# Patient Record
Sex: FEMALE | Race: WHITE | NOT HISPANIC OR LATINO | Employment: FULL TIME | URBAN - METROPOLITAN AREA
[De-identification: names, ages, dates, MRNs, and addresses within clinical notes are randomized per-mention and may not be internally consistent; named-entity substitution may affect disease eponyms.]

---

## 2017-02-02 ENCOUNTER — TELEPHONE (OUTPATIENT)
Dept: OBSTETRICS AND GYNECOLOGY | Age: 37
End: 2017-02-02

## 2017-02-02 ENCOUNTER — OFFICE VISIT (OUTPATIENT)
Dept: OBSTETRICS AND GYNECOLOGY | Age: 37
End: 2017-02-02

## 2017-02-02 ENCOUNTER — PROCEDURE VISIT (OUTPATIENT)
Dept: OBSTETRICS AND GYNECOLOGY | Age: 37
End: 2017-02-02

## 2017-02-02 VITALS
BODY MASS INDEX: 22.99 KG/M2 | HEIGHT: 65 IN | DIASTOLIC BLOOD PRESSURE: 82 MMHG | WEIGHT: 138 LBS | SYSTOLIC BLOOD PRESSURE: 118 MMHG

## 2017-02-02 DIAGNOSIS — O09.521 AMA (ADVANCED MATERNAL AGE) MULTIGRAVIDA 35+, FIRST TRIMESTER: Primary | ICD-10-CM

## 2017-02-02 DIAGNOSIS — O20.9 VAGINAL BLEEDING IN PREGNANCY, FIRST TRIMESTER: ICD-10-CM

## 2017-02-02 DIAGNOSIS — O20.0 THREATENED ABORTION: Primary | ICD-10-CM

## 2017-02-02 PROCEDURE — 99213 OFFICE O/P EST LOW 20 MIN: CPT | Performed by: OBSTETRICS & GYNECOLOGY

## 2017-02-02 PROCEDURE — 96372 THER/PROPH/DIAG INJ SC/IM: CPT | Performed by: OBSTETRICS & GYNECOLOGY

## 2017-02-02 PROCEDURE — 76817 TRANSVAGINAL US OBSTETRIC: CPT | Performed by: OBSTETRICS & GYNECOLOGY

## 2017-02-02 NOTE — PROGRESS NOTES
"Subjective      Jimena Ferguson is a 36 y.o. female.  Jimena reports bleeding her bleeding is bright red and heavy like a period.  She would be about 6 weeks by last menstrual period.  Blood type is negative AB- . She is not in acute distress. Ectopic risks: none.    Cycle length: regular.  Pregnancy testing: at home.  Pregnancy imaging: not done.  Blood type: AB negative.  Other lab results: none.    The following portions of the patient's history were reviewed and updated as appropriate: allergies, current medications, past family history, past medical history, past social history, past surgical history and problem list.    Review of Systems  no abdominal pain, or dizziness     Objective        Visit Vitals   • /82   • Ht 65\" (165.1 cm)   • Wt 138 lb (62.6 kg)   • LMP 2016   • BMI 22.96 kg/m2       General: alert, appears stated age and cooperative   Heart:    Lungs:    Abdomen: soft, non-tender, without masses or organomegaly   Vulva: normal   Vagina: normal mucosa   Cervix: no cervical motion tenderness   Uterus: bulky, non-tender, retroverted   Adnexa: no mass, fullness, tenderness     Imaging  Limited office ultrasound: small gest sac with no yolk sac or fetal pole, no FF ? Mass near right adnexa      Assessment/Plan     Jimena was seen today for follow-up.    Diagnoses and all orders for this visit:    Threatened   -     CBC & Differential  -     HCG, B-subunit, Quantitative  -     Type & Screen  -     Rubella Antibody, IgG  -     Progesterone      Ectopic warnings given - check labs, stop work and check BHCG in 48 hours  Rhogam today  "

## 2017-02-03 LAB
BASOPHILS # BLD AUTO: 0 X10E3/UL (ref 0–0.2)
BASOPHILS NFR BLD AUTO: 0 %
EOSINOPHIL # BLD AUTO: 0.2 X10E3/UL (ref 0–0.4)
EOSINOPHIL NFR BLD AUTO: 2 %
ERYTHROCYTE [DISTWIDTH] IN BLOOD BY AUTOMATED COUNT: 12.3 % (ref 12.3–15.4)
HCG INTACT+B SERPL-ACNC: 4954 MIU/ML
HCT VFR BLD AUTO: 33.5 % (ref 34–46.6)
HGB BLD-MCNC: 11.6 G/DL (ref 11.1–15.9)
IMM GRANULOCYTES # BLD: 0 X10E3/UL (ref 0–0.1)
IMM GRANULOCYTES NFR BLD: 0 %
LYMPHOCYTES # BLD AUTO: 2.1 X10E3/UL (ref 0.7–3.1)
LYMPHOCYTES NFR BLD AUTO: 24 %
MCH RBC QN AUTO: 32.4 PG (ref 26.6–33)
MCHC RBC AUTO-ENTMCNC: 34.6 G/DL (ref 31.5–35.7)
MCV RBC AUTO: 94 FL (ref 79–97)
MONOCYTES # BLD AUTO: 0.7 X10E3/UL (ref 0.1–0.9)
MONOCYTES NFR BLD AUTO: 8 %
NEUTROPHILS # BLD AUTO: 5.9 X10E3/UL (ref 1.4–7)
NEUTROPHILS NFR BLD AUTO: 66 %
PLATELET # BLD AUTO: 236 X10E3/UL (ref 150–379)
PROGEST SERPL-MCNC: 7.1 NG/ML
RBC # BLD AUTO: 3.58 X10E6/UL (ref 3.77–5.28)
RUBV IGG SERPL IA-ACNC: 3.37 INDEX
WBC # BLD AUTO: 9 X10E3/UL (ref 3.4–10.8)

## 2017-02-04 ENCOUNTER — APPOINTMENT (OUTPATIENT)
Dept: LAB | Facility: HOSPITAL | Age: 37
End: 2017-02-04

## 2017-02-04 ENCOUNTER — RESULTS ENCOUNTER (OUTPATIENT)
Dept: OBSTETRICS AND GYNECOLOGY | Age: 37
End: 2017-02-04

## 2017-02-04 DIAGNOSIS — O20.0 THREATENED ABORTION: ICD-10-CM

## 2017-02-04 DIAGNOSIS — O20.0 THREATENED ABORTION: Primary | ICD-10-CM

## 2017-02-04 LAB — HCG INTACT+B SERPL-ACNC: 1303 MIU/ML

## 2017-02-04 PROCEDURE — 36415 COLL VENOUS BLD VENIPUNCTURE: CPT | Performed by: OBSTETRICS & GYNECOLOGY

## 2017-02-04 PROCEDURE — 84702 CHORIONIC GONADOTROPIN TEST: CPT | Performed by: OBSTETRICS & GYNECOLOGY

## 2017-02-04 NOTE — PROGRESS NOTES
Lab called and needed order placed for HCG, they have an order but it is not signed by MD. Reviewed chart and confirmed plan for hcg today and notified primary OB of pt's lab draw today.

## 2017-02-07 ENCOUNTER — TELEPHONE (OUTPATIENT)
Dept: OBSTETRICS AND GYNECOLOGY | Age: 37
End: 2017-02-07

## 2017-02-07 NOTE — TELEPHONE ENCOUNTER
----- Message from Albert Ramon MD sent at 2/7/2017  9:01 AM EST -----  Please notify level has fallen to 500 consistent with miscarriage we do not have to draw any further levels.

## 2017-04-27 ENCOUNTER — PROCEDURE VISIT (OUTPATIENT)
Dept: OBSTETRICS AND GYNECOLOGY | Age: 37
End: 2017-04-27

## 2017-04-27 ENCOUNTER — OFFICE VISIT (OUTPATIENT)
Dept: OBSTETRICS AND GYNECOLOGY | Age: 37
End: 2017-04-27

## 2017-04-27 VITALS
HEIGHT: 65 IN | BODY MASS INDEX: 23.16 KG/M2 | DIASTOLIC BLOOD PRESSURE: 76 MMHG | SYSTOLIC BLOOD PRESSURE: 108 MMHG | WEIGHT: 139 LBS

## 2017-04-27 DIAGNOSIS — R10.2 PELVIC PAIN: ICD-10-CM

## 2017-04-27 DIAGNOSIS — R10.2 PELVIC PAIN: Primary | ICD-10-CM

## 2017-04-27 DIAGNOSIS — O33.0: Primary | ICD-10-CM

## 2017-04-27 LAB
BILIRUB BLD-MCNC: NEGATIVE MG/DL
CLARITY, POC: CLEAR
COLOR UR: YELLOW
GLUCOSE UR STRIP-MCNC: NEGATIVE MG/DL
KETONES UR QL: NEGATIVE
LEUKOCYTE EST, POC: NEGATIVE
NITRITE UR-MCNC: NEGATIVE MG/ML
PH UR: 6 [PH] (ref 5–8)
PROT UR STRIP-MCNC: NEGATIVE MG/DL
RBC # UR STRIP: NORMAL /UL
SP GR UR: 1.03 (ref 1–1.03)
UROBILINOGEN UR QL: NORMAL

## 2017-04-27 PROCEDURE — 99213 OFFICE O/P EST LOW 20 MIN: CPT | Performed by: OBSTETRICS & GYNECOLOGY

## 2017-04-27 PROCEDURE — 81002 URINALYSIS NONAUTO W/O SCOPE: CPT | Performed by: OBSTETRICS & GYNECOLOGY

## 2017-04-27 PROCEDURE — 76830 TRANSVAGINAL US NON-OB: CPT | Performed by: OBSTETRICS & GYNECOLOGY

## 2017-04-27 RX ORDER — PRENATAL VIT/IRON FUM/FOLIC AC 27MG-0.8MG
TABLET ORAL DAILY
COMMUNITY
End: 2019-06-19

## 2017-04-27 NOTE — PROGRESS NOTES
"Subjective     Chief Complaint   Patient presents with   • Follow-up     pt had SAB 2017, c/o feeling bloated and dull achy pain in lower abdomen       History of Present Illness    Jimena Ferguson is a 37 y.o.  who presents for The pain.  Pelvic pain is bilateral in both lower quadrants.  Patient describes it as a dull burning pain occurs randomly throughout the cycle.  There does not seem to be a relationship to menses or ovulation.  She also sees no relationship to bowel movements.  No burning with urination.  She does feel like there is some deep pelvic pain with intercourse like something is being hit.  Patient had a miscarriage in February.  Pregnancy was very early.  No heartbeat was seen and patient passed pregnancy tissue spontaneously.  She would like to become pregnant again but if she experiences another miscarriage she states she will stop trying.    Patient is  and has no concerns about STDs.  Her menses are regular every 28-30 days, lasting 4-7 days, dysmenorrhea none   Obstetric History:  OB History      Para Term  AB TAB SAB Ectopic Multiple Living    2 1   1  1   1        Obstetric Comments    BALDO         Menstrual History:     Patient's last menstrual period was 2017.         Current contraception: none      Review of Systems    Review of Systems   Constitutional: Negative for fatigue.   Respiratory: Negative for shortness of breath.    Gastrointestinal: Positive for abdominal pain.   Genitourinary: Negative for dysuria.   Neurological: Negative for headaches.   Psychiatric/Behavioral: Negative for dysphoric mood.         Objective   Physical Exam    /76  Ht 65\" (165.1 cm)  Wt 139 lb (63 kg)  LMP 2017  BMI 23.13 kg/m2    General:   alert, appears stated age and cooperative   Neck: thyroid normal to palpation   Heart: regular rate and rhythm   Lungs: clear to auscultation bilaterally   Abdomen: soft, non-tender, without masses or organomegaly "   Breast: Not examined    Vulva: normal, Bartholin's, Urethra, Clever's normal   Vagina: normal mucosa, normal discharge   Cervix: no cervical motion tenderness and no lesions   Uterus: anteverted, mobile, normal shape and consistency, tender in the cul de sac   Adnexa: normal adnexa   Rectal: not indicated     Urine dip is negative.    Ultrasound shows a uterus that measures 8 x 6 x 5 cm.  The endometrium is normal.  No masses are seen.  The left ovary appears adjacent to the uterus and does not move with the ultrasound.  Patient seems to have more pain on the left.  The ovaries appear normal in size without cyst.  Assessment/Plan   Jimena was seen today for follow-up.    Diagnoses and all orders for this visit:    Pelvic abnormality during pregnancy in first trimester, antepartum  -     Cancel: Urine Culture  -     POC Urinalysis Dipstick     of note a diagnosis that was incorrect is linked to patient's urine dipstick patient is not pregnant.    Pelvic pain.  Pain is suspicious for endometriosis.  We discussed endometriosis in detail with diagrams.  Patient was given the option of a laparoscopy for diagnosis.  At this point patient does not desire laparoscopy and would like to try for pregnancy.  I recommend that she try ovulation predictor kits.  We discussed fertile days of the cycle.  If she has a positive pregnancy test she will call and we will check levels due to the history of miscarriage.    All questions answered.  Breast self exam technique reviewed and patient encouraged to perform self-exam monthly.  Discussed healthy lifestyle modifications.  Recommended 30 minutes of aerobic exercise five times per week.  Discussed calcium needs to prevent osteoporosis.

## 2017-09-25 ENCOUNTER — TELEPHONE (OUTPATIENT)
Dept: OBSTETRICS AND GYNECOLOGY | Age: 37
End: 2017-09-25

## 2017-09-25 NOTE — TELEPHONE ENCOUNTER
Pt called today to sched NOB appt LMP: 08-28-17. Pt is sched for 10-17-17. Pt states had miscarriage back in jan-feb and wasn't sure if she needed to be seen sooner. Please advise.     Pt#: [phone]  's #: [phone]

## 2017-09-25 NOTE — TELEPHONE ENCOUNTER
Patient could come in for a viability ultrasound when she is about 5 weeks pregnant with Carie pearson or Judith.

## 2017-10-02 ENCOUNTER — OFFICE VISIT (OUTPATIENT)
Dept: OBSTETRICS AND GYNECOLOGY | Age: 37
End: 2017-10-02

## 2017-10-02 ENCOUNTER — PROCEDURE VISIT (OUTPATIENT)
Dept: OBSTETRICS AND GYNECOLOGY | Age: 37
End: 2017-10-02

## 2017-10-02 VITALS
SYSTOLIC BLOOD PRESSURE: 102 MMHG | WEIGHT: 138 LBS | BODY MASS INDEX: 22.99 KG/M2 | DIASTOLIC BLOOD PRESSURE: 60 MMHG | HEIGHT: 65 IN

## 2017-10-02 DIAGNOSIS — O36.80X0 ENCOUNTER TO DETERMINE FETAL VIABILITY OF PREGNANCY, SINGLE OR UNSPECIFIED FETUS: Primary | ICD-10-CM

## 2017-10-02 DIAGNOSIS — Z34.90 PREGNANCY AT EARLY STAGE: Primary | ICD-10-CM

## 2017-10-02 PROCEDURE — 76830 TRANSVAGINAL US NON-OB: CPT | Performed by: PHYSICIAN ASSISTANT

## 2017-10-02 PROCEDURE — 99213 OFFICE O/P EST LOW 20 MIN: CPT | Performed by: PHYSICIAN ASSISTANT

## 2017-10-02 NOTE — PROGRESS NOTES
"Subjective     Chief Complaint   Patient presents with   • Threatened Miscarriage     occ cramping checking viability       Jimena Ferguson is a 37 y.o.  whose LMP is Patient's last menstrual period was 2017 (approximate). presents with ***      {Also Blocks:17330}    The following portions of the patient's history were reviewed and updated as appropriate:{history reviewed:51793::\"vital signs\",\"allergies\",\"current medications\",\"past medical history\",\"past social history\",\"past surgical history\",\"problem list\"}      Review of Systems   {ros - complete:89939}     Objective      /60  Ht 65\" (165.1 cm)  Wt 138 lb (62.6 kg)  LMP 2017 (Approximate)  Breastfeeding? No  BMI 22.96 kg/m2    Physical Exam    General:   {gen appearance:94310}   Heart: {EXAM; HEART:21978}   Lungs: {lung exam:01399}   Breast: {Exam; breast:28322}   Neck: {EXAM; NECK:08415}   Abdomen: {{EXAM; ABDOMEN:82396}   CVA: {CVA tenderness:67570}   Pelvis: {EXAM; PELVIC:84322}   Extremities: {EXAM; EXTREMITY:44758}   Neurologic: {NEURO:25621}   Psychiatric: {PSYCH:60165}       Lab Review   Labs: {abd pain lab:30811}     Imaging   {abd pain image:76608}    Assessment/Plan     ASSESSMENT  1. Pregnancy at early stage        PLAN  1.   Orders Placed This Encounter   Procedures   • HCG, B-subunit, Quantitative   • Progesterone       2. Medications prescribed this encounter:      No orders of the defined types were placed in this encounter.      3. ***    Follow up: {numbers 1-12:} {DAYS, WEEKS, MONTHS, YEARS:59482}    SHABANA Hernandez  10/2/2017           "

## 2017-10-02 NOTE — PROGRESS NOTES
"Subjective     Chief Complaint   Patient presents with   • Threatened Miscarriage     occ cramping checking viability       Jimena Ferguson is a 37 y.o.  whose LMP is Patient's last menstrual period was 2017 (approximate). presents with early pregnancy with  H/o miscarriage.  She has a 3 yo at home.  Is here for early u/s to confirm viability.  Last menses shows her to be 5 wks exactly today.  She is not having any pain or bleeding. She is otherwise healthy. Here with .  She is a pt of Dr Ramon, new to me for this problem      No Additional Complaints Reported    The following portions of the patient's history were reviewed and updated as appropriate:vital signs, allergies, current medications, past family history, past medical history, past social history, past surgical history and problem list      Review of Systems   A comprehensive review of systems was negative except for: early pregnancy       Objective      /60  Ht 65\" (165.1 cm)  Wt 138 lb (62.6 kg)  LMP 2017 (Approximate)  Breastfeeding? No  BMI 22.96 kg/m2    Physical Exam    General:   alert, comfortable and no distress   Heart: Not performed today   Lungs: Not performed today.   Breast: Not performed today   Neck: na   Abdomen: {Not performed today   CVA: Not performed today   Pelvis: Not performed today   Extremities: Not performed today   Neurologic: negative   Psychiatric: Normal affect, judgement, and mood       Lab Review   Labs: No data reviewed     Imaging   Ultrasound - Pelvic Vaginal  US shows nothing in the uterus but lining measures 24.73 mm/  B ovaries nl and corpus luteal noted on left ovary    Assessment/Plan     ASSESSMENT  1. Pregnancy at early stage        PLAN  1.   Orders Placed This Encounter   Procedures   • HCG, B-subunit, Quantitative   • Progesterone       2. Pt reassured and discussed appropriate self care (ok to p/u 3 yo if no c/o pain or bleeding). Will check labs today and repeat additional " labs on Wednesday then determine f/u u/s. She is to call with any pain or bleeding.      Follow up: 1 week(s)    SHABANA Hernandez  10/2/2017

## 2017-10-03 ENCOUNTER — TELEPHONE (OUTPATIENT)
Dept: OBSTETRICS AND GYNECOLOGY | Age: 37
End: 2017-10-03

## 2017-10-03 DIAGNOSIS — Z34.90 EARLY STAGE OF PREGNANCY: Primary | ICD-10-CM

## 2017-10-03 LAB
HCG INTACT+B SERPL-ACNC: NORMAL MIU/ML
PROGEST SERPL-MCNC: 10.6 NG/ML

## 2017-10-03 NOTE — TELEPHONE ENCOUNTER
Left message,      Pt will also need to be scheduled with an ultrasound later this week or early next week.   (she is scheduled but not with an u/s also it needs to be GYN not OB)

## 2017-10-03 NOTE — TELEPHONE ENCOUNTER
----- Message from SHABANA Michele sent at 10/3/2017  9:08 AM EDT -----  Let her know her quants are appropriate at 1579 and her progesterone level looks appropriate.  Can plan repeat labs tomorrow as discussed at appt.  Then plan u/s later this week or early next depending on availability

## 2017-10-05 ENCOUNTER — TELEPHONE (OUTPATIENT)
Dept: OBSTETRICS AND GYNECOLOGY | Age: 37
End: 2017-10-05

## 2017-10-05 LAB
HCG INTACT+B SERPL-ACNC: NORMAL MIU/ML
PROGEST SERPL-MCNC: 6.3 NG/ML

## 2017-10-05 NOTE — TELEPHONE ENCOUNTER
Dr rapp    I know you are on call but I thought you might get this.  Jimena called today about her labs.  I told her that I would send you a message.  And that either I would call her after I heard from  You.  I did text the numbers to ahsan this morning too.    I did reassure her that her quant numbers did go up.  And that I know we would see her tomorrow.  But I didn't know what else you would want me to tell her.

## 2017-10-06 ENCOUNTER — PROCEDURE VISIT (OUTPATIENT)
Dept: OBSTETRICS AND GYNECOLOGY | Age: 37
End: 2017-10-06

## 2017-10-06 ENCOUNTER — OFFICE VISIT (OUTPATIENT)
Dept: OBSTETRICS AND GYNECOLOGY | Age: 37
End: 2017-10-06

## 2017-10-06 VITALS — DIASTOLIC BLOOD PRESSURE: 64 MMHG | BODY MASS INDEX: 22.8 KG/M2 | SYSTOLIC BLOOD PRESSURE: 120 MMHG | WEIGHT: 137 LBS

## 2017-10-06 DIAGNOSIS — O36.80X0 ENCOUNTER TO DETERMINE FETAL VIABILITY OF PREGNANCY, SINGLE OR UNSPECIFIED FETUS: Primary | ICD-10-CM

## 2017-10-06 DIAGNOSIS — Z34.90 EARLY STAGE OF PREGNANCY: Primary | ICD-10-CM

## 2017-10-06 DIAGNOSIS — R79.89 LOW SERUM PROGESTERONE: ICD-10-CM

## 2017-10-06 PROCEDURE — 76817 TRANSVAGINAL US OBSTETRIC: CPT | Performed by: PHYSICIAN ASSISTANT

## 2017-10-06 PROCEDURE — 99213 OFFICE O/P EST LOW 20 MIN: CPT | Performed by: PHYSICIAN ASSISTANT

## 2017-10-06 NOTE — PROGRESS NOTES
Subjective     Chief Complaint   Patient presents with   • Threatened Miscarriage     follow up from 10/02        Jimena Ferguson is a 37 y.o.  whose LMP is Patient's last menstrual period was 2017 (approximate). presents for a f/u U/S.  She was seen on Monday at 5 w 0 days of pregnancy and nothing was seen in the endometrium however the lining was thick. She is not having pain or bleeding. She feels ok otherwise. She has a 3 yo daughter here with her today, she is getting ready to turn 4, she also has her Mom with her today.  They are getting ready to drive up to [facility] today.    She is a pt of Dr Ramon    No Additional Complaints Reported    The following portions of the patient's history were reviewed and updated as appropriate:vital signs, allergies, current medications, past family history, past medical history, past social history, past surgical history and problem list      Review of Systems   A comprehensive review of systems was negative except for: Genitourinary: positive for early pregnancy with recent h/o miscarriage     Objective      /64  Wt 137 lb (62.1 kg)  LMP 2017 (Approximate)  Breastfeeding? No  BMI 22.8 kg/m2    Physical Exam    General:   alert, comfortable and no distress   Heart: Not performed today   Lungs: Not performed today.   Breast: Not performed today   Neck: na   Abdomen: {Not performed today   CVA: Not performed today   Pelvis: Not performed today   Extremities: Not performed today   Neurologic: negative   Psychiatric: Normal affect, judgement, and mood       Lab Review   Labs: HCG - quantitative, progesterone level     Imaging   Ultrasound - Pelvic Vaginal  U/S shows GS that measures 5 w 3days. Small subserosal calcified fibroid noted in fundus.     Assessment/Plan     ASSESSMENT  1. Early stage of pregnancy    2. Low serum progesterone        PLAN  1. No orders of the defined types were placed in this encounter.      2. Medications prescribed  this encounter:        New Medications Ordered This Visit   Medications   • progesterone (CRINONE) 8 % gel vaginal gel     Sig: Insert 90 mg into the vagina Daily.     Dispense:  1.125 g     Refill:  1       3. Disc findings with pt, there is a GS c/w her dates.  Her quant hcg level looks good, progesterone is a little low and pt would like to try supplementation.  We did discuss that while this may be beneficial there can always be a chance of  Miscarriage.  I reassured pt she is doing everything appropriately (she is concerned about eating too much tofu and having alcohol prior to finding out she was pregnant) and told her we could be cautiously optimistic. She is to call if there is pain or bleeding. Se has an appt with Dr Ramon scheduled already for the 12th but will also need to add an U/S    Follow up: 6 day(s)    SHABANA Hernandez  10/6/2017

## 2017-10-12 ENCOUNTER — PROCEDURE VISIT (OUTPATIENT)
Dept: OBSTETRICS AND GYNECOLOGY | Age: 37
End: 2017-10-12

## 2017-10-12 ENCOUNTER — OFFICE VISIT (OUTPATIENT)
Dept: OBSTETRICS AND GYNECOLOGY | Age: 37
End: 2017-10-12

## 2017-10-12 VITALS
HEIGHT: 65 IN | SYSTOLIC BLOOD PRESSURE: 108 MMHG | DIASTOLIC BLOOD PRESSURE: 74 MMHG | BODY MASS INDEX: 22.82 KG/M2 | WEIGHT: 137 LBS

## 2017-10-12 DIAGNOSIS — Z3A.01 6 WEEKS GESTATION OF PREGNANCY: ICD-10-CM

## 2017-10-12 DIAGNOSIS — O20.0 THREATENED ABORTION: Primary | ICD-10-CM

## 2017-10-12 DIAGNOSIS — O09.521 AMA (ADVANCED MATERNAL AGE) MULTIGRAVIDA 35+, FIRST TRIMESTER: ICD-10-CM

## 2017-10-12 DIAGNOSIS — Z3A.01 6 WEEKS GESTATION OF PREGNANCY: Primary | ICD-10-CM

## 2017-10-12 PROCEDURE — 76817 TRANSVAGINAL US OBSTETRIC: CPT | Performed by: OBSTETRICS & GYNECOLOGY

## 2017-10-12 PROCEDURE — 99212 OFFICE O/P EST SF 10 MIN: CPT | Performed by: OBSTETRICS & GYNECOLOGY

## 2017-10-12 NOTE — PROGRESS NOTES
"Subjective      Jimena Ferguson is a 37 y.o. female. Jimena reports delayed menses since 8/28 LMP.  Had beta hCGs and ultrasounds prior.  Last week and ultrasound showed a 5 week gestational sac that was empty.  Her beta hCG was rising appropriately last week.  Her progesterone was low and she was given supplementation but has not started yet.  She has not had any vaginal bleeding or pelvic pain.  She is not in acute distress. Ectopic risks: none.    Cycle length: regular q 28  d.  Pregnancy testing: Normally rising beta hCGs.  Pregnancy imaging: Ultrasound last week showed a gestational sac 5 weeks in size that was empty.  Prior ultrasound showed a thickened endometrium..  Blood type: AB negative.  Other lab results: none.    The following portions of the patient's history were reviewed and updated as appropriate: allergies, current medications, past family history, past medical history, past social history, past surgical history and problem list.    Review of Systems  Pertinent items are noted in HPI.     Objective      /74  Ht 65\" (165.1 cm)  Wt 137 lb (62.1 kg)  BMI 22.8 kg/m2    General: alert, appears stated age and cooperative   Heart:    Lungs:    Abdomen:    Vulva:    Vagina:    Cervix:    Uterus:    Adnexa:      Imaging  Limited office ultrasound: Ultrasound shows a 6 week 1 day gestational sac with what appears to be a solid yolk sac.  Gestational sac size is consistent with dates however the solid yolk sac is concerning no fetal pole is seen yet      Assessment/Plan     Jimena was seen today for follow-up.    Diagnoses and all orders for this visit:    6 weeks gestation of pregnancy  -     HCG, B-subunit, Quantitative  -     OB Panel With HIV      I discussed with the patient and her  that the gestational sac is 6 weeks' size But we only see a solid yolk sacs I am concerned about a pregnancy that may not be healthy.  We will check patient's type and screen today.  She does have a history of " being Rh-.  We will also check a beta hCG today and repeat an ultrasound in 1 week.  I instructed her to call me with any bleeding and also encouraged her to start the progesterone to continue through the first trimester.

## 2017-10-13 ENCOUNTER — TELEPHONE (OUTPATIENT)
Dept: OBSTETRICS AND GYNECOLOGY | Age: 37
End: 2017-10-13

## 2017-10-13 LAB
ABO GROUP BLD: (no result)
BASOPHILS # BLD AUTO: 0 X10E3/UL (ref 0–0.2)
BASOPHILS NFR BLD AUTO: 1 %
BLD GP AB SCN SERPL QL: NEGATIVE
EOSINOPHIL # BLD AUTO: 0.2 X10E3/UL (ref 0–0.4)
EOSINOPHIL NFR BLD AUTO: 4 %
ERYTHROCYTE [DISTWIDTH] IN BLOOD BY AUTOMATED COUNT: 12.1 % (ref 12.3–15.4)
HBV SURFACE AG SERPL QL IA: NEGATIVE
HCG INTACT+B SERPL-ACNC: NORMAL MIU/ML
HCT VFR BLD AUTO: 36.7 % (ref 34–46.6)
HGB BLD-MCNC: 12.4 G/DL (ref 11.1–15.9)
HIV 1+2 AB+HIV1 P24 AG SERPL QL IA: NON REACTIVE
IMM GRANULOCYTES # BLD: 0 X10E3/UL (ref 0–0.1)
IMM GRANULOCYTES NFR BLD: 0 %
LYMPHOCYTES # BLD AUTO: 1.7 X10E3/UL (ref 0.7–3.1)
LYMPHOCYTES NFR BLD AUTO: 37 %
MCH RBC QN AUTO: 32 PG (ref 26.6–33)
MCHC RBC AUTO-ENTMCNC: 33.8 G/DL (ref 31.5–35.7)
MCV RBC AUTO: 95 FL (ref 79–97)
MONOCYTES # BLD AUTO: 0.5 X10E3/UL (ref 0.1–0.9)
MONOCYTES NFR BLD AUTO: 11 %
NEUTROPHILS # BLD AUTO: 2.2 X10E3/UL (ref 1.4–7)
NEUTROPHILS NFR BLD AUTO: 47 %
PLATELET # BLD AUTO: 260 X10E3/UL (ref 150–379)
RBC # BLD AUTO: 3.87 X10E6/UL (ref 3.77–5.28)
RH BLD: NEGATIVE
RPR SER QL: NON REACTIVE
RUBV IGG SERPL IA-ACNC: 3.31 INDEX
WBC # BLD AUTO: 4.6 X10E3/UL (ref 3.4–10.8)

## 2017-10-13 NOTE — TELEPHONE ENCOUNTER
The patient.  Her beta hCG is 13,000 this rise is lower than would be expected.  I asked patient to keep her ultrasound appointment for next week.  I'm concerned about a possible miscarriage.

## 2017-10-17 ENCOUNTER — PROCEDURE VISIT (OUTPATIENT)
Dept: OBSTETRICS AND GYNECOLOGY | Age: 37
End: 2017-10-17

## 2017-10-17 DIAGNOSIS — O36.80X0 ENCOUNTER TO DETERMINE FETAL VIABILITY OF PREGNANCY, SINGLE OR UNSPECIFIED FETUS: Primary | ICD-10-CM

## 2017-10-23 ENCOUNTER — CLINICAL SUPPORT (OUTPATIENT)
Dept: OBSTETRICS AND GYNECOLOGY | Age: 37
End: 2017-10-23

## 2017-10-23 DIAGNOSIS — O03.9 MISCARRIAGE: ICD-10-CM

## 2017-10-23 PROCEDURE — 96372 THER/PROPH/DIAG INJ SC/IM: CPT | Performed by: OBSTETRICS & GYNECOLOGY

## 2017-10-25 ENCOUNTER — OFFICE VISIT (OUTPATIENT)
Dept: OBSTETRICS AND GYNECOLOGY | Age: 37
End: 2017-10-25

## 2017-10-25 VITALS
BODY MASS INDEX: 23.16 KG/M2 | SYSTOLIC BLOOD PRESSURE: 118 MMHG | HEIGHT: 65 IN | DIASTOLIC BLOOD PRESSURE: 74 MMHG | WEIGHT: 139 LBS

## 2017-10-25 DIAGNOSIS — O03.9 COMPLETE ABORTION: Primary | ICD-10-CM

## 2017-10-25 PROCEDURE — 99212 OFFICE O/P EST SF 10 MIN: CPT | Performed by: OBSTETRICS & GYNECOLOGY

## 2017-10-25 NOTE — PROGRESS NOTES
"  Chief [id number] after miscarriage    History of present illness- Patient is a 37 y.o.  who complains of heavy vaginal bleeding and then passage of tissue this week.  Patient came in for her RhoGAM on Monday.  Her antibody screen was negative.  Her bleeding is decreasing and she is not having any pain.  Her previous ultrasound showed a 6 week sac with no fetal pole.  She is here with her  and is concerned why she is had 2 miscarriages both at about 5 weeks with no fetal pole with either one.  She does have a daughter.  Her cycles have become more regular after the birth of her daughter.    Review of Systems   Constitutional: Negative for fatigue.   Respiratory: Negative for shortness of breath.    Gastrointestinal: Negative for abdominal pain.   Genitourinary: Negative for dysuria.   Neurological: Negative for headaches.   Psychiatric/Behavioral: Negative for dysphoric mood.     /74  Ht 65\" (165.1 cm)  Wt 139 lb (63 kg)  BMI 23.13 kg/m2  Physical Exam   Constitutional: She appears well-developed and well-nourished. No distress.   Psychiatric: She has a normal mood and affect. Her behavior is normal. Judgment normal.       Pelvic ultrasound - declined    Jimena was seen today for follow-up.    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  Her losses were very early.  We discussed that genetic causes are the most common cause of pregnancy loss.  I did recommend thyroid testing along with lupus anticoagulant and anti-anticardiolipin antibodies to be tested now and repeat in 8 weeks.  I also recommend an HSG and paternal karyotype testing Patient states that she does not want to do any further testing.    She is thinking that she will likely not try for pregnancy again.   We also discussed depression after her miscarriage at this time patient's moods " are appropriate and she does not need any medication.    Pt 's blood type is AB- and she did receive Rhogam this week.

## 2018-01-26 ENCOUNTER — CONVERSION ENCOUNTER (OUTPATIENT)
Dept: FAMILY MEDICINE CLINIC | Facility: CLINIC | Age: 38
End: 2018-01-26

## 2018-01-26 ENCOUNTER — HOSPITAL ENCOUNTER (OUTPATIENT)
Dept: FAMILY MEDICINE CLINIC | Facility: CLINIC | Age: 38
Setting detail: SPECIMEN
Discharge: HOME | End: 2018-01-26
Attending: FAMILY MEDICINE | Admitting: FAMILY MEDICINE

## 2018-01-26 LAB
ALBUMIN SERPL-MCNC: 3.9 G/DL (ref 3.5–4.8)
ALBUMIN/GLOB SERPL: 1.3 {RATIO} (ref 1–1.7)
ALP SERPL-CCNC: 53 IU/L (ref 32–91)
ALT SERPL-CCNC: 14 IU/L (ref 14–54)
ANION GAP SERPL CALC-SCNC: 9.9 MMOL/L (ref 10–20)
AST SERPL-CCNC: 15 IU/L (ref 15–41)
BASOPHILS # BLD AUTO: 0.1 10*3/UL (ref 0–0.2)
BASOPHILS NFR BLD AUTO: 1 % (ref 0–2)
BILIRUB SERPL-MCNC: 1.2 MG/DL (ref 0.3–1.2)
BUN SERPL-MCNC: 8 MG/DL (ref 8–20)
BUN/CREAT SERPL: 13.3 (ref 5.4–26.2)
CALCIUM SERPL-MCNC: 9 MG/DL (ref 8.9–10.3)
CHLORIDE SERPL-SCNC: 104 MMOL/L (ref 101–111)
CONV CO2: 27 MMOL/L (ref 22–32)
CONV TOTAL PROTEIN: 6.9 G/DL (ref 6.1–7.9)
CREAT UR-MCNC: 0.6 MG/DL (ref 0.4–1)
DIFFERENTIAL METHOD BLD: (no result)
EOSINOPHIL # BLD AUTO: 0.2 10*3/UL (ref 0–0.3)
EOSINOPHIL # BLD AUTO: 4 % (ref 0–3)
ERYTHROCYTE [DISTWIDTH] IN BLOOD BY AUTOMATED COUNT: 11.8 % (ref 11.5–14.5)
GLOBULIN UR ELPH-MCNC: 3 G/DL (ref 2.5–3.8)
GLUCOSE SERPL-MCNC: 81 MG/DL (ref 65–99)
HCT VFR BLD AUTO: 38.4 % (ref 35–49)
HGB BLD-MCNC: 13 G/DL (ref 12–15)
LYMPHOCYTES # BLD AUTO: 1.6 10*3/UL (ref 0.8–4.8)
LYMPHOCYTES NFR BLD AUTO: 33 % (ref 18–42)
MCH RBC QN AUTO: 32.3 PG (ref 26–32)
MCHC RBC AUTO-ENTMCNC: 33.9 G/DL (ref 32–36)
MCV RBC AUTO: 95.2 FL (ref 80–94)
MONOCYTES # BLD AUTO: 0.5 10*3/UL (ref 0.1–1.3)
MONOCYTES NFR BLD AUTO: 10 % (ref 2–11)
NEUTROPHILS # BLD AUTO: 2.5 10*3/UL (ref 2.3–8.6)
NEUTROPHILS NFR BLD AUTO: 52 % (ref 50–75)
NRBC BLD AUTO-RTO: 0 /100{WBCS}
NRBC/RBC NFR BLD MANUAL: 0 10*3/UL
PLATELET # BLD AUTO: 229 10*3/UL (ref 150–450)
PMV BLD AUTO: 8.9 FL (ref 7.4–10.4)
POTASSIUM SERPL-SCNC: 3.9 MMOL/L (ref 3.6–5.1)
RBC # BLD AUTO: 4.04 10*6/UL (ref 4–5.4)
SODIUM SERPL-SCNC: 137 MMOL/L (ref 136–144)
WBC # BLD AUTO: 4.8 10*3/UL (ref 4.5–11.5)

## 2018-02-02 NOTE — TELEPHONE ENCOUNTER
Dr MERLOS pt had ob panel and hcg level done yest, could we get the hcg level to tell the pt to reassure her?   88 M PMH HTN, Afib, COPD here with Lt hip pain after a fall, possible syncopal event.   Afebrile, initially had leukocytosis to 15 which is now normal.     Found to have + blood culture 1/2 bottles, PCR with coagulase negative Staphylococcus  Received 1 dose of vancomycin   Repeat blood culture 88 M PMH HTN, Afib, CAD s/p stent, COPD not on home oxygen, ex smoker, ulcerative colitis s/p TAVR 3 y ago here with Lt hip pain after a fall, possible syncopal event. No acute abnormalities on imaging or EEG  Afebrile, initially had leukocytosis to 15 (likely reactive) which is now normal.   Unknown allergy to penicillin    Found to have + blood culture 1/2 bottles, PCR with coagulase negative Staphylococcus. Suspect procurement contaminant  Received 1 dose of vancomycin   Repeat blood culture 88 M PMH HTN, Afib, CAD s/p stent, COPD not on home oxygen, ex smoker, ulcerative colitis s/p TAVR 3 y ago here with Lt hip pain after a fall, possible syncopal event. No acute abnormalities on imaging or EEG  Afebrile, initially had leukocytosis to 15 (likely reactive) which is now normal. No other obvious source of infection, U/A negative, CXR with no pneumonia, no diarrhea. Acute on chronic renal failure.   Unknown allergy to penicillin      Plan:  Found to have + blood culture 1/2 bottles, PCR with coagulase negative Staphylococcus. Suspect procurement contaminant  Received 1 dose of vancomycin   Repeat blood culture in lab  observe off abx for now  Echo, pt with hypotension, ? pre renal , nephrology on board.   Consider orthostatic vitals if feasible.

## 2019-06-03 VITALS
SYSTOLIC BLOOD PRESSURE: 117 MMHG | OXYGEN SATURATION: 99 % | DIASTOLIC BLOOD PRESSURE: 81 MMHG | BODY MASS INDEX: 22.34 KG/M2 | WEIGHT: 139 LBS | HEART RATE: 68 BPM | HEIGHT: 66 IN

## 2019-06-19 ENCOUNTER — OFFICE VISIT (OUTPATIENT)
Dept: OBSTETRICS AND GYNECOLOGY | Age: 39
End: 2019-06-19

## 2019-06-19 VITALS
DIASTOLIC BLOOD PRESSURE: 72 MMHG | BODY MASS INDEX: 22.82 KG/M2 | SYSTOLIC BLOOD PRESSURE: 110 MMHG | HEIGHT: 66 IN | WEIGHT: 142 LBS

## 2019-06-19 DIAGNOSIS — Z12.4 ROUTINE CERVICAL SMEAR: ICD-10-CM

## 2019-06-19 DIAGNOSIS — R22.31 AXILLARY MASS, RIGHT: ICD-10-CM

## 2019-06-19 DIAGNOSIS — Z11.51 SPECIAL SCREENING EXAMINATION FOR HUMAN PAPILLOMAVIRUS (HPV): ICD-10-CM

## 2019-06-19 DIAGNOSIS — Z01.419 ENCOUNTER FOR GYNECOLOGICAL EXAMINATION WITHOUT ABNORMAL FINDING: Primary | ICD-10-CM

## 2019-06-19 DIAGNOSIS — R10.2 PELVIC PAIN: ICD-10-CM

## 2019-06-19 PROCEDURE — 99395 PREV VISIT EST AGE 18-39: CPT | Performed by: OBSTETRICS & GYNECOLOGY

## 2019-06-19 RX ORDER — MOMETASONE FUROATE 50 UG/1
2 SPRAY, METERED NASAL DAILY
COMMUNITY
End: 2021-10-25

## 2019-06-19 RX ORDER — DIPHENHYDRAMINE HCL 25 MG
25 CAPSULE ORAL EVERY 6 HOURS PRN
COMMUNITY
End: 2021-10-25

## 2019-06-19 NOTE — PROGRESS NOTES
Subjective     Chief Complaint   Patient presents with   • Gynecologic Exam     AC. Pt had 1 week of spotting after her menses about 2 months ago.       History of Present Illness    Jimena Ferguson is a 39 y.o.  who presents for annual exam.  The patient notes some midcycle sharp pain.  The pain is quick and only last about 10 seconds but occurs 3-4 times.  She does have a history of having some pelvic pain.  She does note that in the past few months her cramps have not been this intense and she has no pain with intercourse.  In the past we have done an ultrasound and we thought the left ovary may be against the uterus.  I was suspicious for endometriosis but the patient did not desire laparoscopy.  She does not desire any hormonal suppression to try to treat the pain.  She thinks the pain is not that bad but is concerned about it.  Her last ultrasound was over a year ago.    Patient is also recently had some eczema on her lower legs which has been treated.      Her daughter Meagan is doing well and is 5 years old.     Patient notes some swelling under her right armpit.  She notes that it is been going on for years and occurs only at the beginning of her cycle.  She does not recall it happening with breast-feeding.  Her menses are regular every 28-30 days, lasting 4 days , dysmenorrhea moderate, occurring first 2 days    Obstetric History:  OB History      Para Term  AB Living    3 1     2 1    SAB TAB Ectopic Molar Multiple Live Births    2         1        Obstetric Comments    BALDO         Menstrual History:     Patient's last menstrual period was 2019.         Current contraception: none  History of abnormal Pap smear: no  Received Gardasil immunization: no  Perform regular self breast exam: no  Family history of uterine or ovarian cancer: no  Family History of colon cancer: no  Family history of breast cancer: no    Mammogram: not indicated.  Colonoscopy: not indicated.  DEXA: not  "indicated.    Exercise: exercises 5 times a week walk 30 minutes and weights  Calcium/Vitamin D: adequate intake    The following portions of the patient's history were reviewed and updated as appropriate: allergies, current medications, past family history, past medical history, past social history, past surgical history and problem list.    Review of Systems    Review of Systems   Constitutional: Negative for fatigue.   Respiratory: Negative for shortness of breath.    Gastrointestinal: Negative for abdominal pain.   Genitourinary: Negative for dysuria.   Neurological: Negative for headaches.   Psychiatric/Behavioral: Negative for dysphoric mood.         Objective   Physical Exam    /72   Ht 166.4 cm (65.5\")   Wt 64.4 kg (142 lb)   LMP 05/22/2019   BMI 23.27 kg/m²     General:   alert, appears stated age and cooperative   Neck: no asymmetry, masses, or scars and trachea midline and normal to palpitation   Heart: regular rate and rhythm   Lungs: clear to auscultation bilaterally   Abdomen: soft, non-tender, without masses or organomegaly   Breast: negative findings: normal in size and symmetry, normal contour with no evidence of flattening or dimpling, skin normal, nipples everted without rashes or discharge, palpation negative for masses or nodules and positive findings: There is approximately 2 to 3 cm mass in the right axilla.  The area is mobile and tender.  Suspicious for possible axillary tail of the breast.   Vulva: normal, Bartholin's, Urethra, [place]al's normal   Vagina: normal mucosa, normal discharge   Cervix: no cervical motion tenderness and no lesions   Uterus: mobile, non-tender, normal shape and consistency   Adnexa: no mass, fullness, tenderness   Rectal: not indicated     Assessment/Plan   Jimena was seen today for gynecologic exam.    Diagnoses and all orders for this visit:    Encounter for gynecological examination without abnormal finding  -     IGP, Aptima HPV, Rfx 16 / 18,45    Pelvic " pain    Axillary mass, right  -     US breast right limited; Future  -     Ambulatory Referral to Breast Surgery    Routine cervical smear  -     IGP, Aptima HPV, Rfx 16 / 18,45    Special screening examination for human papillomavirus (HPV)  -     IGP, Aptima HPV, Rfx 16 / 18,45      We discussed pelvic pain.  I do recommend the patient start NSAIDs for the first 48 hours of her cycle to decrease prostaglandins.  We discussed hormonal suppression with oral contraceptive pills or other hormonal treatments.  Patient declines.  She will come back for ultrasound in the next few weeks.  This will be scheduled.  She is currently not using any contraception.  She has had 2 miscarriages.  We discussed the risk of advanced maternal age pregnancy including increased risk of congenital defects, pregnancy complications, pregnancy loss and infertility.  Patient declines contraception.    For the right axillary mass patient will be sent for right breast ultrasound and evaluation by Dr. Cody.  All questions answered.  Breast self exam technique reviewed and patient encouraged to perform self-exam monthly.  Discussed healthy lifestyle modifications.  Recommended 30 minutes of aerobic exercise five times per week.  Discussed calcium needs to prevent osteoporosis.

## 2019-06-21 ENCOUNTER — TELEPHONE (OUTPATIENT)
Dept: FAMILY MEDICINE CLINIC | Facility: CLINIC | Age: 39
End: 2019-06-21

## 2019-06-21 LAB
CYTOLOGIST CVX/VAG CYTO: NORMAL
CYTOLOGY CVX/VAG DOC CYTO: NORMAL
CYTOLOGY CVX/VAG DOC THIN PREP: NORMAL
DX ICD CODE: NORMAL
HIV 1 & 2 AB SER-IMP: NORMAL
HPV I/H RISK 4 DNA CVX QL PROBE+SIG AMP: NEGATIVE
OTHER STN SPEC: NORMAL
STAT OF ADQ CVX/VAG CYTO-IMP: NORMAL

## 2019-06-26 ENCOUNTER — APPOINTMENT (OUTPATIENT)
Dept: WOMENS IMAGING | Facility: HOSPITAL | Age: 39
End: 2019-06-26

## 2019-06-26 PROCEDURE — 76641 ULTRASOUND BREAST COMPLETE: CPT | Performed by: RADIOLOGY

## 2019-07-01 ENCOUNTER — TELEPHONE (OUTPATIENT)
Dept: OBSTETRICS AND GYNECOLOGY | Age: 39
End: 2019-07-01

## 2019-07-01 NOTE — TELEPHONE ENCOUNTER
----- Message from Albert Ramon MD sent at 7/1/2019  7:30 AM EDT -----  Please notify right breast ultrasound is normal.  Patient should still follow-up with Dr. Cody.

## 2019-07-25 ENCOUNTER — OFFICE VISIT (OUTPATIENT)
Dept: OBSTETRICS AND GYNECOLOGY | Age: 39
End: 2019-07-25

## 2019-07-25 ENCOUNTER — PROCEDURE VISIT (OUTPATIENT)
Dept: OBSTETRICS AND GYNECOLOGY | Age: 39
End: 2019-07-25

## 2019-07-25 VITALS
WEIGHT: 141 LBS | DIASTOLIC BLOOD PRESSURE: 64 MMHG | BODY MASS INDEX: 22.66 KG/M2 | SYSTOLIC BLOOD PRESSURE: 108 MMHG | HEIGHT: 66 IN

## 2019-07-25 DIAGNOSIS — R10.2 PELVIC PAIN: Primary | ICD-10-CM

## 2019-07-25 PROCEDURE — 76830 TRANSVAGINAL US NON-OB: CPT | Performed by: OBSTETRICS & GYNECOLOGY

## 2019-07-25 PROCEDURE — 99213 OFFICE O/P EST LOW 20 MIN: CPT | Performed by: OBSTETRICS & GYNECOLOGY

## 2019-07-25 NOTE — PROGRESS NOTES
"  Chief dgznkmqyt-[id number]-mk of pelvic pain.    History of present illness- Patient is a 39 y.o.  who complains of pelvic pain.  Patient has been using NSAIDs for her pelvic pain.  She has declined any hormonal treatments.  She is here today for an ultrasound.  She states the pain is manageable but she wanted to do the ultrasound just to be sure there was nothing significant going on in her pelvis.        /64   Ht 166.4 cm (65.5\")   Wt 64 kg (141 lb)   LMP 2019   BMI 23.11 kg/m²   Physical Exam   Constitutional: She appears well-developed and well-nourished. No distress.   Psychiatric: She has a normal mood and affect. Her behavior is normal. Thought content normal.       Pelvic ultrasound shows the uterus is anteverted and appears normal.  It measures 5.6 x 4.2 x 4.3 cm.  No masses or distortions are seen.  The left ovary measures 2.4 x 2.1 x 1.9 cm.  The largest follicle measures 13 mm x 12 mm.  The right ovary measures 2.9 x 2.5 x 1.2 cm.  No free fluid is seen.    Jimena was seen today for follow-up.    Diagnoses and all orders for this visit:    Pelvic pain    Ultrasound is reassuring today.  No cause for the pelvic pain is seen.  We discussed that endometriosis is a common cause of pelvic pain.  We discussed common treatments for endometriosis and we discussed that laparoscopy would need to be done for diagnosis.  Patient feels her pain is manageable with NSAIDs and heat.  I advised her to take NSAIDs with food for the first 48 hours of her cycle and at midcycle for potential ovulation pain.  I asked her to call back if she would like to try any of the other treatments we discussed.    15 minutes were spent in face-to-face consultation with the patient.  "

## 2021-10-25 ENCOUNTER — OFFICE VISIT (OUTPATIENT)
Dept: OBSTETRICS AND GYNECOLOGY | Age: 41
End: 2021-10-25

## 2021-10-25 VITALS
BODY MASS INDEX: 22.56 KG/M2 | DIASTOLIC BLOOD PRESSURE: 70 MMHG | HEIGHT: 66 IN | SYSTOLIC BLOOD PRESSURE: 112 MMHG | WEIGHT: 140.4 LBS

## 2021-10-25 DIAGNOSIS — Z12.31 ENCOUNTER FOR SCREENING MAMMOGRAM FOR MALIGNANT NEOPLASM OF BREAST: ICD-10-CM

## 2021-10-25 DIAGNOSIS — N94.0 MID CYCLE PAIN: ICD-10-CM

## 2021-10-25 DIAGNOSIS — Z01.419 WELL WOMAN EXAM WITH ROUTINE GYNECOLOGICAL EXAM: Primary | ICD-10-CM

## 2021-10-25 DIAGNOSIS — Z12.4 SCREENING FOR CERVICAL CANCER: ICD-10-CM

## 2021-10-25 PROCEDURE — 99396 PREV VISIT EST AGE 40-64: CPT | Performed by: NURSE PRACTITIONER

## 2021-10-25 NOTE — PROGRESS NOTES
Subjective     Chief Complaint   Patient presents with   • Gynecologic Exam     AE   LAST PAP 19-, HPV-       History of Present Illness    Jimena Ferguson is a 41 y.o.  who presents for annual exam.    Doing well  She has never had a mammogram  Continues to have mid cycle pain RLQ c/w ovulation pain  She has had an u/s in the past  She is not interested in OCP's to suppress ovaries  She takes tylenol which helps  Her menses are regular every 28-30 days, lasting 4-7 days, dysmenorrhea mild, occurring first 1-2 days of flow   Pt of Dr. Ramon  Obstetric History:  OB History        3    Para   1    Term                AB   2    Living   1       SAB   2    IAB        Ectopic        Molar        Multiple        Live Births   1          Obstetric Comments   BALDO            Menstrual History:     Patient's last menstrual period was 10/14/2021 (approximate).         Current contraception: none  History of abnormal Pap smear: no  Received Gardasil immunization: no  Perform regular self breast exam: yes - regularly  Family history of uterine or ovarian cancer: no  Family History of colon cancer: no  Family history of breast cancer: no    Mammogram: ordered.  Colonoscopy: not indicated.  DEXA: not indicated.    Exercise: moderately active - walks 5 days a week  Calcium/Vitamin D: uses supplements    The following portions of the patient's history were reviewed and updated as appropriate: allergies, current medications, past family history, past medical history, past social history, past surgical history and problem list.    Review of Systems   Constitutional: Negative.    Respiratory: Negative.    Cardiovascular: Negative.    Gastrointestinal: Negative.    Genitourinary: Negative.         Mid cycle RLQ pain   Skin: Negative.    Psychiatric/Behavioral: Negative.            Objective   Physical Exam  Constitutional:       General: She is awake.      Appearance: Normal appearance. She is well-developed.    HENT:      Head: Normocephalic and atraumatic.      Nose: Nose normal.   Neck:      Thyroid: No thyroid mass, thyromegaly or thyroid tenderness.   Cardiovascular:      Rate and Rhythm: Normal rate and regular rhythm.      Pulses: Normal pulses.      Heart sounds: Normal heart sounds.   Pulmonary:      Effort: Pulmonary effort is normal.      Breath sounds: Normal breath sounds.   Chest:   Breasts: Breasts are symmetrical.      Right: Normal. No swelling, bleeding, inverted nipple, mass, nipple discharge, skin change, tenderness or supraclavicular adenopathy.      Left: Normal. No swelling, bleeding, inverted nipple, mass, nipple discharge, skin change, tenderness or supraclavicular adenopathy.        Comments: Dense breast tissue - left breast  Abdominal:      General: Abdomen is flat. Bowel sounds are normal.      Palpations: Abdomen is soft.      Tenderness: There is no abdominal tenderness.   Genitourinary:     General: Normal vulva.      Labia:         Right: No rash, tenderness, lesion or injury.         Left: No rash, tenderness, lesion or injury.       Urethra: No prolapse, urethral pain, urethral swelling or urethral lesion.      Vagina: Normal. No signs of injury. No vaginal discharge, erythema, tenderness, bleeding, lesions or prolapsed vaginal walls.      Cervix: No discharge, friability, lesion, erythema or cervical bleeding.      Uterus: Normal. Not enlarged, not tender and no uterine prolapse.       Adnexa: Right adnexa normal and left adnexa normal.        Right: No mass, tenderness or fullness.          Left: No mass, tenderness or fullness.        Rectum: Normal. No mass.   Musculoskeletal:      Cervical back: Normal range of motion and neck supple.   Lymphadenopathy:      Upper Body:      Right upper body: No supraclavicular adenopathy.      Left upper body: No supraclavicular adenopathy.   Skin:     General: Skin is warm and dry.   Neurological:      General: No focal deficit present.       "Mental Status: She is alert and oriented to person, place, and time.   Psychiatric:         Mood and Affect: Mood normal.         Behavior: Behavior normal. Behavior is cooperative.         Thought Content: Thought content normal.         Judgment: Judgment normal.         /70   Ht 166.4 cm (65.5\")   Wt 63.7 kg (140 lb 6.4 oz)   LMP 10/14/2021 (Approximate)   Breastfeeding No   BMI 23.01 kg/m²     Assessment/Plan   Diagnoses and all orders for this visit:    1. Well woman exam with routine gynecological exam (Primary)  -     IGP, Apt HPV,rfx 16 / 18,45    2. Screening for cervical cancer  -     IGP, Apt HPV,rfx 16 / 18,45    3. Encounter for screening mammogram for malignant neoplasm of breast  -     Mammo screening digital tomosynthesis bilateral w CAD; Future    4. Mid cycle pain        All questions answered.  Breast self exam technique reviewed and patient encouraged to perform self-exam monthly.  Discussed healthy lifestyle modifications.  Recommended 30 minutes of aerobic exercise five times per week.  Discussed calcium needs to prevent osteoporosis.    -Pap smear today  -Mammo ordered   -Declines ocp's to help suppress ovaries  -F/u 1 year, sooner prn                "

## 2021-11-08 ENCOUNTER — OFFICE VISIT (OUTPATIENT)
Dept: FAMILY MEDICINE CLINIC | Facility: CLINIC | Age: 41
End: 2021-11-08

## 2021-11-08 VITALS
DIASTOLIC BLOOD PRESSURE: 79 MMHG | SYSTOLIC BLOOD PRESSURE: 125 MMHG | RESPIRATION RATE: 16 BRPM | OXYGEN SATURATION: 98 % | WEIGHT: 141 LBS | TEMPERATURE: 98.4 F | HEART RATE: 100 BPM | BODY MASS INDEX: 22.66 KG/M2 | HEIGHT: 66 IN

## 2021-11-08 DIAGNOSIS — R06.2 WHEEZING WITHOUT DIAGNOSIS OF ASTHMA: ICD-10-CM

## 2021-11-08 DIAGNOSIS — J30.9 ALLERGIC RHINITIS, UNSPECIFIED SEASONALITY, UNSPECIFIED TRIGGER: Primary | ICD-10-CM

## 2021-11-08 PROCEDURE — 99213 OFFICE O/P EST LOW 20 MIN: CPT | Performed by: PHYSICIAN ASSISTANT

## 2021-11-08 RX ORDER — ALBUTEROL SULFATE 90 UG/1
2 AEROSOL, METERED RESPIRATORY (INHALATION) EVERY 4 HOURS PRN
Qty: 18 G | Refills: 2 | Status: SHIPPED | OUTPATIENT
Start: 2021-11-08 | End: 2022-10-27 | Stop reason: SDUPTHER

## 2021-11-08 RX ORDER — MONTELUKAST SODIUM 10 MG/1
10 TABLET ORAL NIGHTLY
Qty: 30 TABLET | Refills: 5 | Status: SHIPPED | OUTPATIENT
Start: 2021-11-08 | End: 2022-10-27 | Stop reason: SDUPTHER

## 2021-11-08 NOTE — PROGRESS NOTES
Subjective   Jimena Ferguson is a 41 y.o. female.     Pt presents with complaint of allergies and wheezing. She was cleaning out her closet that was really [place] on weekend of  and it set off her allergies. She has had intermittent wheezing since then and did have some shortness of breath for the first couple of night after cleaning out the closet. She hasn't been taking any allergy medication since she usually gets side effects when taking them. No fevers. She also has hx of eczema.  She has used a inhaler in the past when she has had wheezing which seemed to help.       The following portions of the patient's history were reviewed and updated as appropriate: allergies, current medications, past family history, past medical history, past social history, past surgical history and problem list.  Past Medical History:   Diagnosis Date   • Frequent UTI    • History of irregular menstrual cycles    • Irregular menses    • Left lower quadrant pain    • PPD positive     TB gold test is negative   • SAB (spontaneous )    • Swollen lymph nodes    • Vaginal delivery      No past surgical history on file.  Family History   Problem Relation Age of Onset   • Hypertension Father    • Coronary artery disease Maternal Grandmother    • Heart attack Maternal Grandmother    • Alzheimer's disease Maternal Grandfather    • Hyperlipidemia Mother    • No Known Problems Paternal Grandfather    • No Known Problems Paternal Grandmother    • No Known Problems Maternal Aunt    • No Known Problems Maternal Uncle    • No Known Problems Paternal Aunt    • No Known Problems Paternal Uncle      Social History     Socioeconomic History   • Marital status:    Tobacco Use   • Smoking status: Former Smoker     Packs/day: 0.50     Years: 12.00     Pack years: 6.00   Substance and Sexual Activity   • Alcohol use: Yes     Alcohol/week: 7.0 standard drinks     Types: 3 Glasses of wine, 4 Cans of beer per week   • Sexual activity:  "Yes     Partners: Male     Birth control/protection: None         Current Outpatient Medications:   •  albuterol sulfate  (90 Base) MCG/ACT inhaler, Inhale 2 puffs Every 4 (Four) Hours As Needed for Wheezing., Disp: 18 g, Rfl: 2  •  Crisaborole (EUCRISA) 2 % ointment, Apply 1 dose topically 2 (Two) Times a Day., Disp: 30 g, Rfl: 1  •  montelukast (Singulair) 10 MG tablet, Take 1 tablet by mouth Every Night., Disp: 30 tablet, Rfl: 5    Review of Systems   Constitutional: Negative for activity change, appetite change, chills, diaphoresis, fatigue, fever, unexpected weight gain and unexpected weight loss.   HENT: Positive for congestion and sneezing. Negative for trouble swallowing.    Respiratory: Positive for shortness of breath and wheezing. Negative for cough and chest tightness.    Cardiovascular: Negative for chest pain, palpitations and leg swelling.   Musculoskeletal: Negative for gait problem.   Neurological: Negative for dizziness, weakness and light-headedness.     /79 (BP Location: Right arm, Patient Position: Sitting, Cuff Size: Adult)   Pulse 100   Temp 98.4 °F (36.9 °C) (Temporal)   Resp 16   Ht 166.4 cm (65.5\")   Wt 64 kg (141 lb)   LMP 10/14/2021 (Approximate)   SpO2 98%   BMI 23.11 kg/m²       Objective   Physical Exam  Vitals and nursing note reviewed.   Constitutional:       Appearance: Normal appearance. She is normal weight.   HENT:      Head: Normocephalic and atraumatic.      Nose: Nose normal.      Mouth/Throat:      Mouth: Mucous membranes are moist.      Pharynx: Oropharynx is clear.   Neck:      Vascular: No carotid bruit.   Cardiovascular:      Rate and Rhythm: Normal rate and regular rhythm.      Heart sounds: Normal heart sounds.   Pulmonary:      Effort: Pulmonary effort is normal.      Breath sounds: Normal breath sounds.   Musculoskeletal:      Cervical back: Normal range of motion and neck supple.   Skin:     General: Skin is warm and dry.   Neurological:      " Mental Status: She is alert and oriented to person, place, and time.   Psychiatric:         Mood and Affect: Mood normal.         Behavior: Behavior normal.         Thought Content: Thought content normal.         Procedures     Assessment/Plan   Diagnoses and all orders for this visit:    1. Allergic rhinitis, unspecified seasonality, unspecified trigger (Primary)  Comments:  Pt to try singulair at night and see if that helps with her allergies and eczema.  Orders:  -     montelukast (Singulair) 10 MG tablet; Take 1 tablet by mouth Every Night.  Dispense: 30 tablet; Refill: 5    2. Wheezing without diagnosis of asthma  Comments:  Likely allergic type asthma.  Pt to try albuterol inhaler and let me know if not improving or worsening.  Orders:  -     montelukast (Singulair) 10 MG tablet; Take 1 tablet by mouth Every Night.  Dispense: 30 tablet; Refill: 5  -     albuterol sulfate  (90 Base) MCG/ACT inhaler; Inhale 2 puffs Every 4 (Four) Hours As Needed for Wheezing.  Dispense: 18 g; Refill: 2

## 2021-12-27 ENCOUNTER — APPOINTMENT (OUTPATIENT)
Dept: WOMENS IMAGING | Facility: HOSPITAL | Age: 41
End: 2021-12-27

## 2021-12-27 PROCEDURE — 77067 SCR MAMMO BI INCL CAD: CPT | Performed by: RADIOLOGY

## 2021-12-27 PROCEDURE — 77063 BREAST TOMOSYNTHESIS BI: CPT | Performed by: RADIOLOGY

## 2022-07-11 ENCOUNTER — TELEPHONE (OUTPATIENT)
Dept: OBSTETRICS AND GYNECOLOGY | Age: 42
End: 2022-07-11

## 2022-07-11 NOTE — TELEPHONE ENCOUNTER
DELETE AFTER REVIEWING: Telephone encounter to be sent to the clinical pool     Caller: Jimena Ferguson    Relationship to patient: Self    Best call back number:[phone]    PRACTICE RESCHEDULING ANNUAL WITH JOSIANE BANEGAS FROM DR. DAVIS    Type of visit: ANNUAL    Requested date: ANY Monday-SHE CAN PNLY MAKE APPTS ON MONDAYS    If rescheduling, when is the original appointment: 11/01/2022    Additional notes: PT IS OKAY WITH SEEING ANY APRN-I COULD NOT FIND A Monday APPT FOR THE CLIENT

## 2022-10-27 ENCOUNTER — OFFICE VISIT (OUTPATIENT)
Dept: FAMILY MEDICINE CLINIC | Facility: CLINIC | Age: 42
End: 2022-10-27

## 2022-10-27 VITALS
BODY MASS INDEX: 23.8 KG/M2 | OXYGEN SATURATION: 100 % | DIASTOLIC BLOOD PRESSURE: 84 MMHG | HEART RATE: 82 BPM | TEMPERATURE: 98.4 F | WEIGHT: 143 LBS | SYSTOLIC BLOOD PRESSURE: 122 MMHG

## 2022-10-27 DIAGNOSIS — J30.9 ALLERGIC RHINITIS, UNSPECIFIED SEASONALITY, UNSPECIFIED TRIGGER: ICD-10-CM

## 2022-10-27 DIAGNOSIS — J45.20 MILD INTERMITTENT ASTHMA, UNSPECIFIED WHETHER COMPLICATED: Primary | ICD-10-CM

## 2022-10-27 PROCEDURE — 99213 OFFICE O/P EST LOW 20 MIN: CPT | Performed by: FAMILY MEDICINE

## 2022-10-27 RX ORDER — FLUTICASONE PROPIONATE 50 MCG
2 SPRAY, SUSPENSION (ML) NASAL DAILY
COMMUNITY

## 2022-10-27 RX ORDER — MONTELUKAST SODIUM 10 MG/1
10 TABLET ORAL NIGHTLY
Qty: 30 TABLET | Refills: 5 | Status: SHIPPED | OUTPATIENT
Start: 2022-10-27

## 2022-10-27 RX ORDER — BUDESONIDE AND FORMOTEROL FUMARATE DIHYDRATE 160; 4.5 UG/1; UG/1
2 AEROSOL RESPIRATORY (INHALATION)
Qty: 10.2 G | Refills: 3 | Status: SHIPPED | OUTPATIENT
Start: 2022-10-27 | End: 2022-11-07 | Stop reason: SDUPTHER

## 2022-10-27 RX ORDER — ALBUTEROL SULFATE 90 UG/1
2 AEROSOL, METERED RESPIRATORY (INHALATION) EVERY 4 HOURS PRN
Qty: 18 G | Refills: 2 | Status: SHIPPED | OUTPATIENT
Start: 2022-10-27

## 2022-10-27 NOTE — PROGRESS NOTES
Subjective   Jimena Ferguson is a 42 y.o. female.     History of Present Illness  Jimena Ferguson is in for some chest tightness and difficulty breathing of late.  She is a former smoker but has had this issue seasonally for the last 2 years.  She also had COVID previously and feels this episode to be a little worse than last years episode.  She has an albuterol inhaler that she uses for symptoms and is having to use it quite often.  She is getting short of breath with simple things like taking stairs or excessive walking.  She was previously given Singulair for allergies but she was leery of taking it so she did not use it. There is no history of chest pain. There is no history of issue with bowel or bladder dysfunction. There is no history of dizziness or lightheadedness. There is no history of issue with sleep or mood. There is no history of issue with present medication.            /84 (BP Location: Left arm, Patient Position: Sitting, Cuff Size: Adult)   Pulse 82   Temp 98.4 °F (36.9 °C) (Temporal)   Wt 64.9 kg (143 lb)   SpO2 100%   BMI 23.80 kg/m²       Chief Complaint   Patient presents with   • Wheezing     Chest tightness, using her inhaler 3 times a day which is not normal - been going on for a month now           Current Outpatient Medications:   •  albuterol sulfate  (90 Base) MCG/ACT inhaler, Inhale 2 puffs Every 4 (Four) Hours As Needed for Wheezing., Disp: 18 g, Rfl: 2  •  Crisaborole (EUCRISA) 2 % ointment, Apply 1 dose topically 2 (Two) Times a Day., Disp: 30 g, Rfl: 1  •  fluticasone (FLONASE) 50 MCG/ACT nasal spray, 2 sprays into the nostril(s) as directed by provider Daily., Disp: , Rfl:   •  montelukast (Singulair) 10 MG tablet, Take 1 tablet by mouth Every Night., Disp: 30 tablet, Rfl: 5  •  budesonide-formoterol (Symbicort) 160-4.5 MCG/ACT inhaler, Inhale 2 puffs 2 (Two) Times a Day., Disp: 10.2 g, Rfl: 3  •  erythromycin (ROMYCIN) 5 MG/GM ophthalmic ointment, Administer  to  the right eye Every 4 (Four) Hours While Awake., Disp: 3.5 g, Rfl: 0        The following portions of the patient's history were reviewed and updated as appropriate: allergies, current medications, past family history, past medical history, past social history, past surgical history, and problem list.    Review of Systems   Constitutional: Negative for activity change, fatigue and fever.   HENT: Positive for postnasal drip and rhinorrhea. Negative for congestion, sinus pressure, sinus pain, sore throat and trouble swallowing.    Eyes: Negative for visual disturbance.   Respiratory: Positive for shortness of breath and wheezing. Negative for cough and chest tightness.    Cardiovascular: Negative for chest pain.   Gastrointestinal: Negative for abdominal distention, abdominal pain, constipation, diarrhea, nausea and vomiting.   Genitourinary: Negative for difficulty urinating and dysuria.   Musculoskeletal: Negative for back pain and neck pain.   Psychiatric/Behavioral: Negative for agitation, hallucinations, sleep disturbance and suicidal ideas.       Objective   Physical Exam  Vitals and nursing note reviewed.   Constitutional:       Appearance: Normal appearance.   HENT:      Right Ear: Tympanic membrane and ear canal normal.      Left Ear: Tympanic membrane and ear canal normal.      Nose: Rhinorrhea present. No congestion.   Cardiovascular:      Rate and Rhythm: Normal rate and regular rhythm.      Heart sounds: Normal heart sounds.   Pulmonary:      Effort: Pulmonary effort is normal.      Breath sounds: Wheezing present. No rales.   Abdominal:      General: Bowel sounds are normal.      Palpations: Abdomen is soft.   Musculoskeletal:      Cervical back: Neck supple.   Lymphadenopathy:      Cervical: No cervical adenopathy.   Neurological:      Mental Status: She is alert.           Assessment & Plan   Problems Addressed this Visit    None  Visit Diagnoses     Mild intermittent asthma, unspecified whether  complicated    -  Primary    Likely allergic type asthma.  Pt to try albuterol inhaler and let me know if not improving or worsening.    Relevant Medications    budesonide-formoterol (Symbicort) 160-4.5 MCG/ACT inhaler    montelukast (Singulair) 10 MG tablet    albuterol sulfate  (90 Base) MCG/ACT inhaler    Allergic rhinitis, unspecified seasonality, unspecified trigger        Pt to try singulair at night and see if that helps with her allergies and eczema.    Relevant Medications    montelukast (Singulair) 10 MG tablet      Diagnoses       Codes Comments    Mild intermittent asthma, unspecified whether complicated    -  Primary ICD-10-CM: J45.20  ICD-9-CM: 493.90 Likely allergic type asthma.  Pt to try albuterol inhaler and let me know if not improving or worsening.    Allergic rhinitis, unspecified seasonality, unspecified trigger     ICD-10-CM: J30.9  ICD-9-CM: 477.9 Pt to try singulair at night and see if that helps with her allergies and eczema.        I will try her on some Symbicort seasonally and I did ask her to fill the Singulair  I will renew her albuterol inhaler  We could do pulmonary function tests but for now wants yes she responds to the Symbicort  I have asked her to update me soon on response and see us as needed for now  I did give her a spacer to use with her inhalers

## 2022-11-07 NOTE — TELEPHONE ENCOUNTER
Caller: Jimena Ferguson    Relationship: Self    Best call back number: [phone]    Requested Prescriptions:   Requested Prescriptions     Pending Prescriptions Disp Refills   • budesonide-formoterol (Symbicort) 160-4.5 MCG/ACT inhaler 10.2 g 3     Sig: Inhale 2 puffs 2 (Two) Times a Day.        Pharmacy where request should be sent: [facility] [address] [place] [address] [phone] [place] [phone] FX     Additional details provided by patient: PATIENT IS REQUESTING TO SWITCH PHARMACIES FOR THIS PRESCRIPTION DUE TO IT BEING TOO EXPENSIVE AT [facility].    Does the patient have less than a 3 day supply:  [x] Yes  [] No    Darien Mcclelland Rep   11/07/22 12:37 EST

## 2022-11-09 RX ORDER — BUDESONIDE AND FORMOTEROL FUMARATE DIHYDRATE 160; 4.5 UG/1; UG/1
2 AEROSOL RESPIRATORY (INHALATION)
Qty: 10.2 G | Refills: 3 | Status: SHIPPED | OUTPATIENT
Start: 2022-11-09

## 2022-11-09 NOTE — TELEPHONE ENCOUNTER
PATIENT IS CALLING IN ABOUT THIS MEDICATION SHE STILL HAS NOT PICKED UP, REQUESTING IT BE SENT TO [facility] ON [address].      PLEASE ADVISE     CALLBACK NUMBER IS  [phone]      CONFIRMED PHARMACY  [facility] - [place], [place] - [address] - [phone] [facility] [phone] FX

## 2022-12-22 ENCOUNTER — HOSPITAL ENCOUNTER (OUTPATIENT)
Dept: GENERAL RADIOLOGY | Facility: HOSPITAL | Age: 42
Discharge: HOME | End: 2022-12-22
Admitting: FAMILY MEDICINE

## 2022-12-22 ENCOUNTER — OFFICE VISIT (OUTPATIENT)
Dept: FAMILY MEDICINE CLINIC | Facility: CLINIC | Age: 42
End: 2022-12-22

## 2022-12-22 VITALS
WEIGHT: 142 LBS | HEART RATE: 108 BPM | SYSTOLIC BLOOD PRESSURE: 137 MMHG | DIASTOLIC BLOOD PRESSURE: 84 MMHG | TEMPERATURE: 98.1 F | OXYGEN SATURATION: 99 % | BODY MASS INDEX: 23.66 KG/M2 | HEIGHT: 65 IN

## 2022-12-22 DIAGNOSIS — R05.1 ACUTE COUGH: Primary | ICD-10-CM

## 2022-12-22 DIAGNOSIS — R06.2 WHEEZING: ICD-10-CM

## 2022-12-22 DIAGNOSIS — R50.9 FEVER, UNSPECIFIED FEVER CAUSE: ICD-10-CM

## 2022-12-22 LAB
EXPIRATION DATE: NORMAL
FLUAV AG NPH QL: NEGATIVE
FLUBV AG NPH QL: NEGATIVE
INTERNAL CONTROL: NORMAL
Lab: NORMAL

## 2022-12-22 PROCEDURE — 94640 AIRWAY INHALATION TREATMENT: CPT | Performed by: FAMILY MEDICINE

## 2022-12-22 PROCEDURE — 87804 INFLUENZA ASSAY W/OPTIC: CPT | Performed by: FAMILY MEDICINE

## 2022-12-22 PROCEDURE — 71046 X-RAY EXAM CHEST 2 VIEWS: CPT

## 2022-12-22 PROCEDURE — 99213 OFFICE O/P EST LOW 20 MIN: CPT | Performed by: FAMILY MEDICINE

## 2022-12-22 RX ORDER — IPRATROPIUM BROMIDE AND ALBUTEROL SULFATE 2.5; .5 MG/3ML; MG/3ML
3 SOLUTION RESPIRATORY (INHALATION) ONCE
Status: DISCONTINUED | OUTPATIENT
Start: 2022-12-22 | End: 2022-12-22

## 2022-12-22 RX ORDER — CEFDINIR 300 MG/1
300 CAPSULE ORAL 2 TIMES DAILY
Qty: 20 CAPSULE | Refills: 0 | Status: SHIPPED | OUTPATIENT
Start: 2022-12-22 | End: 2023-01-01

## 2022-12-22 RX ORDER — ALBUTEROL SULFATE 2.5 MG/3ML
2.5 SOLUTION RESPIRATORY (INHALATION) ONCE
Status: COMPLETED | OUTPATIENT
Start: 2022-12-22 | End: 2022-12-22

## 2022-12-22 RX ADMIN — ALBUTEROL SULFATE 2.5 MG: 2.5 SOLUTION RESPIRATORY (INHALATION) at 15:04

## 2022-12-22 NOTE — PROGRESS NOTES
Subjective   Jimena Ferguson is a 42 y.o. female.     History of Present Illness  Comes in with complaints of a cough and wheezing.  She uses albuterol every 4 hours.  She says her symptoms started on Monday.  She tested negative for COVID today.  She had a fever up to 100.3.  Around nephew who was ill on Sat  Left sided ant/post cp  Better with albuterol  Cough   Congestion  Had her flu shot  Cough  Associated symptoms include a fever and wheezing. Pertinent negatives include no chills or shortness of breath.        The following portions of the patient's history were reviewed and updated as appropriate: allergies, current medications, past family history, past medical history, past social history, past surgical history, and problem list.  Past Medical History:   Diagnosis Date   • Allergic Always   • Asthma 2019   • Frequent UTI    • History of irregular menstrual cycles    • Irregular menses    • Left lower quadrant pain    • PPD positive     TB gold test is negative   • SAB (spontaneous )    • Swollen lymph nodes    • Vaginal delivery      History reviewed. No pertinent surgical history.  Family History   Problem Relation Age of Onset   • Hypertension Father    • Coronary artery disease Maternal Grandmother    • Heart attack Maternal Grandmother    • Alzheimer's disease Maternal Grandfather    • Hyperlipidemia Mother    • No Known Problems Paternal Grandfather    • No Known Problems Paternal Grandmother    • No Known Problems Maternal Aunt    • No Known Problems Maternal Uncle    • No Known Problems Paternal Aunt    • No Known Problems Paternal Uncle      Social History     Socioeconomic History   • Marital status:    Tobacco Use   • Smoking status: Former     Packs/day: 0.50     Years: 15.00     Pack years: 7.50     Types: Cigarettes     Start date: 4/15/1997     Quit date: 2012     Years since quitting: 10.8   • Smokeless tobacco: Never   Vaping Use   • Vaping Use: Never used   Substance  "and Sexual Activity   • Alcohol use: Yes     Alcohol/week: 7.0 standard drinks     Types: 3 Glasses of wine, 4 Cans of beer per week   • Sexual activity: Yes     Partners: Male     Birth control/protection: None         Current Outpatient Medications:   •  albuterol sulfate  (90 Base) MCG/ACT inhaler, Inhale 2 puffs Every 4 (Four) Hours As Needed for Wheezing., Disp: 18 g, Rfl: 2  •  Crisaborole (EUCRISA) 2 % ointment, Apply 1 dose topically 2 (Two) Times a Day., Disp: 30 g, Rfl: 1  •  budesonide-formoterol (Symbicort) 160-4.5 MCG/ACT inhaler, Inhale 2 puffs 2 (Two) Times a Day., Disp: 10.2 g, Rfl: 3  •  erythromycin (ROMYCIN) 5 MG/GM ophthalmic ointment, Administer  to the right eye Every 4 (Four) Hours While Awake., Disp: 3.5 g, Rfl: 0  •  fluticasone (FLONASE) 50 MCG/ACT nasal spray, 2 sprays into the nostril(s) as directed by provider Daily., Disp: , Rfl:   •  montelukast (Singulair) 10 MG tablet, Take 1 tablet by mouth Every Night., Disp: 30 tablet, Rfl: 5    Review of Systems   Constitutional: Positive for fatigue and fever. Negative for chills and diaphoresis.   HENT: Positive for congestion.    Respiratory: Positive for cough and wheezing. Negative for shortness of breath.    Gastrointestinal: Negative for nausea and vomiting.   Neurological: Positive for headache.     /84 (BP Location: Left arm, Patient Position: Sitting, Cuff Size: Adult)   Pulse 108   Temp 98.1 °F (36.7 °C) (Temporal)   Ht 165.1 cm (65\")   Wt 64.4 kg (142 lb)   SpO2 99%   BMI 23.63 kg/m²       Objective   Physical Exam  Vitals and nursing note reviewed.   Constitutional:       Appearance: Normal appearance. She is normal weight.   HENT:      Head: Normocephalic and atraumatic.      Right Ear: Tympanic membrane and ear canal normal.      Left Ear: Tympanic membrane and ear canal normal.      Mouth/Throat:      Mouth: Mucous membranes are moist.   Cardiovascular:      Rate and Rhythm: Normal rate.      Heart sounds: " Normal heart sounds.   Pulmonary:      Effort: Pulmonary effort is normal.      Breath sounds: Wheezing present. No rhonchi or rales.   Chest:      Chest wall: No tenderness.   Musculoskeletal:      Cervical back: Neck supple.   Lymphadenopathy:      Cervical: Cervical adenopathy present.   Neurological:      Mental Status: She is alert.           Assessment & Plan   Problems Addressed this Visit    None  Visit Diagnoses     Acute cough    -  Primary    Fever, unspecified fever cause        Wheezing          Diagnoses       Codes Comments    Acute cough    -  Primary ICD-10-CM: R05.1  ICD-9-CM: 786.2     Fever, unspecified fever cause     ICD-10-CM: R50.9  ICD-9-CM: 780.60     Wheezing     ICD-10-CM: R06.2  ICD-9-CM: 786.07         Tested neg for flu A/B  Given albuterol minineb with improvement in symptoms  Will rx generic omnicef  She has albuterol at home  Encouraged to push fluids and rest

## 2023-06-03 ENCOUNTER — APPOINTMENT (OUTPATIENT)
Dept: CT IMAGING | Facility: HOSPITAL | Age: 43
End: 2023-06-03
Payer: COMMERCIAL

## 2023-06-03 ENCOUNTER — HOSPITAL ENCOUNTER (EMERGENCY)
Facility: HOSPITAL | Age: 43
Discharge: HOME | End: 2023-06-03
Attending: STUDENT IN AN ORGANIZED HEALTH CARE EDUCATION/TRAINING PROGRAM | Admitting: STUDENT IN AN ORGANIZED HEALTH CARE EDUCATION/TRAINING PROGRAM
Payer: COMMERCIAL

## 2023-06-03 VITALS
TEMPERATURE: 101.5 F | SYSTOLIC BLOOD PRESSURE: 141 MMHG | HEART RATE: 123 BPM | BODY MASS INDEX: 23.32 KG/M2 | RESPIRATION RATE: 18 BRPM | OXYGEN SATURATION: 98 % | DIASTOLIC BLOOD PRESSURE: 83 MMHG | WEIGHT: 140 LBS | HEIGHT: 65 IN

## 2023-06-03 DIAGNOSIS — D21.9 FIBROID: ICD-10-CM

## 2023-06-03 DIAGNOSIS — R50.9 FEVER, UNSPECIFIED FEVER CAUSE: ICD-10-CM

## 2023-06-03 DIAGNOSIS — R10.9 ABDOMINAL PAIN, UNSPECIFIED ABDOMINAL LOCATION: Primary | ICD-10-CM

## 2023-06-03 LAB
ALBUMIN SERPL-MCNC: 4.3 G/DL (ref 3.5–5.2)
ALBUMIN/GLOB SERPL: 1.4 G/DL
ALP SERPL-CCNC: 64 U/L (ref 39–117)
ALT SERPL W P-5'-P-CCNC: 7 U/L (ref 1–33)
ANION GAP SERPL CALCULATED.3IONS-SCNC: 9.9 MMOL/L (ref 5–15)
AST SERPL-CCNC: 12 U/L (ref 1–32)
BACTERIA UR QL AUTO: NORMAL /HPF
BASOPHILS # BLD AUTO: 0.01 10*3/MM3 (ref 0–0.2)
BASOPHILS NFR BLD AUTO: 0.1 % (ref 0–1.5)
BILIRUB SERPL-MCNC: 0.9 MG/DL (ref 0–1.2)
BILIRUB UR QL STRIP: NEGATIVE
BUN SERPL-MCNC: 8 MG/DL (ref 6–20)
BUN/CREAT SERPL: 12.3 (ref 7–25)
CALCIUM SPEC-SCNC: 9 MG/DL (ref 8.6–10.5)
CHLORIDE SERPL-SCNC: 103 MMOL/L (ref 98–107)
CLARITY UR: CLEAR
CO2 SERPL-SCNC: 23.1 MMOL/L (ref 22–29)
COLOR UR: YELLOW
CREAT SERPL-MCNC: 0.65 MG/DL (ref 0.57–1)
D-LACTATE SERPL-SCNC: 1.4 MMOL/L (ref 0.5–2)
DEPRECATED RDW RBC AUTO: 40.5 FL (ref 37–54)
EGFRCR SERPLBLD CKD-EPI 2021: 112.2 ML/MIN/1.73
EOSINOPHIL # BLD AUTO: 0.04 10*3/MM3 (ref 0–0.4)
EOSINOPHIL NFR BLD AUTO: 0.5 % (ref 0.3–6.2)
ERYTHROCYTE [DISTWIDTH] IN BLOOD BY AUTOMATED COUNT: 11.6 % (ref 12.3–15.4)
GLOBULIN UR ELPH-MCNC: 3 GM/DL
GLUCOSE SERPL-MCNC: 98 MG/DL (ref 65–99)
GLUCOSE UR STRIP-MCNC: NEGATIVE MG/DL
HCG SERPL QL: NEGATIVE
HCT VFR BLD AUTO: 38.6 % (ref 34–46.6)
HGB BLD-MCNC: 13.5 G/DL (ref 12–15.9)
HGB UR QL STRIP.AUTO: ABNORMAL
HYALINE CASTS UR QL AUTO: NORMAL /LPF
IMM GRANULOCYTES # BLD AUTO: 0.01 10*3/MM3 (ref 0–0.05)
IMM GRANULOCYTES NFR BLD AUTO: 0.1 % (ref 0–0.5)
KETONES UR QL STRIP: NEGATIVE
LEUKOCYTE ESTERASE UR QL STRIP.AUTO: NEGATIVE
LIPASE SERPL-CCNC: 28 U/L (ref 13–60)
LYMPHOCYTES # BLD AUTO: 0.9 10*3/MM3 (ref 0.7–3.1)
LYMPHOCYTES NFR BLD AUTO: 12 % (ref 19.6–45.3)
MCH RBC QN AUTO: 33.3 PG (ref 26.6–33)
MCHC RBC AUTO-ENTMCNC: 35 G/DL (ref 31.5–35.7)
MCV RBC AUTO: 95.1 FL (ref 79–97)
MONOCYTES # BLD AUTO: 0.43 10*3/MM3 (ref 0.1–0.9)
MONOCYTES NFR BLD AUTO: 5.7 % (ref 5–12)
NEUTROPHILS NFR BLD AUTO: 6.14 10*3/MM3 (ref 1.7–7)
NEUTROPHILS NFR BLD AUTO: 81.6 % (ref 42.7–76)
NITRITE UR QL STRIP: NEGATIVE
PH UR STRIP.AUTO: 7.5 [PH] (ref 5–8)
PLATELET # BLD AUTO: 203 10*3/MM3 (ref 140–450)
PMV BLD AUTO: 9.9 FL (ref 6–12)
POTASSIUM SERPL-SCNC: 3.6 MMOL/L (ref 3.5–5.2)
PROT SERPL-MCNC: 7.3 G/DL (ref 6–8.5)
PROT UR QL STRIP: NEGATIVE
RBC # BLD AUTO: 4.06 10*6/MM3 (ref 3.77–5.28)
RBC # UR STRIP: NORMAL /HPF
REF LAB TEST METHOD: NORMAL
SODIUM SERPL-SCNC: 136 MMOL/L (ref 136–145)
SP GR UR STRIP: 1.01 (ref 1–1.03)
SQUAMOUS #/AREA URNS HPF: NORMAL /HPF
UROBILINOGEN UR QL STRIP: ABNORMAL
WBC # UR STRIP: NORMAL /HPF
WBC NRBC COR # BLD: 7.53 10*3/MM3 (ref 3.4–10.8)

## 2023-06-03 PROCEDURE — 74177 CT ABD & PELVIS W/CONTRAST: CPT

## 2023-06-03 PROCEDURE — 25010000002 ONDANSETRON PER 1 MG: Performed by: STUDENT IN AN ORGANIZED HEALTH CARE EDUCATION/TRAINING PROGRAM

## 2023-06-03 PROCEDURE — 87040 BLOOD CULTURE FOR BACTERIA: CPT | Performed by: STUDENT IN AN ORGANIZED HEALTH CARE EDUCATION/TRAINING PROGRAM

## 2023-06-03 PROCEDURE — 96365 THER/PROPH/DIAG IV INF INIT: CPT

## 2023-06-03 PROCEDURE — 85025 COMPLETE CBC W/AUTO DIFF WBC: CPT | Performed by: STUDENT IN AN ORGANIZED HEALTH CARE EDUCATION/TRAINING PROGRAM

## 2023-06-03 PROCEDURE — 99283 EMERGENCY DEPT VISIT LOW MDM: CPT

## 2023-06-03 PROCEDURE — 83690 ASSAY OF LIPASE: CPT | Performed by: STUDENT IN AN ORGANIZED HEALTH CARE EDUCATION/TRAINING PROGRAM

## 2023-06-03 PROCEDURE — 80053 COMPREHEN METABOLIC PANEL: CPT | Performed by: STUDENT IN AN ORGANIZED HEALTH CARE EDUCATION/TRAINING PROGRAM

## 2023-06-03 PROCEDURE — 96375 TX/PRO/DX INJ NEW DRUG ADDON: CPT

## 2023-06-03 PROCEDURE — 25510000001 IOPAMIDOL PER 1 ML: Performed by: STUDENT IN AN ORGANIZED HEALTH CARE EDUCATION/TRAINING PROGRAM

## 2023-06-03 PROCEDURE — 83605 ASSAY OF LACTIC ACID: CPT | Performed by: STUDENT IN AN ORGANIZED HEALTH CARE EDUCATION/TRAINING PROGRAM

## 2023-06-03 PROCEDURE — 81001 URINALYSIS AUTO W/SCOPE: CPT | Performed by: STUDENT IN AN ORGANIZED HEALTH CARE EDUCATION/TRAINING PROGRAM

## 2023-06-03 PROCEDURE — 25010000002 MORPHINE PER 10 MG: Performed by: STUDENT IN AN ORGANIZED HEALTH CARE EDUCATION/TRAINING PROGRAM

## 2023-06-03 PROCEDURE — 84703 CHORIONIC GONADOTROPIN ASSAY: CPT | Performed by: STUDENT IN AN ORGANIZED HEALTH CARE EDUCATION/TRAINING PROGRAM

## 2023-06-03 PROCEDURE — 25010000002 PIPERACILLIN SOD-TAZOBACTAM PER 1 G: Performed by: STUDENT IN AN ORGANIZED HEALTH CARE EDUCATION/TRAINING PROGRAM

## 2023-06-03 RX ORDER — MORPHINE SULFATE 2 MG/ML
4 INJECTION, SOLUTION INTRAMUSCULAR; INTRAVENOUS ONCE
Status: COMPLETED | OUTPATIENT
Start: 2023-06-03 | End: 2023-06-03

## 2023-06-03 RX ORDER — DICYCLOMINE HYDROCHLORIDE 10 MG/1
10 CAPSULE ORAL 3 TIMES DAILY PRN
Qty: 30 CAPSULE | Refills: 0 | Status: SHIPPED | OUTPATIENT
Start: 2023-06-03

## 2023-06-03 RX ORDER — SODIUM CHLORIDE 0.9 % (FLUSH) 0.9 %
10 SYRINGE (ML) INJECTION AS NEEDED
Status: CANCELLED | OUTPATIENT
Start: 2023-06-03

## 2023-06-03 RX ORDER — SODIUM CHLORIDE 0.9 % (FLUSH) 0.9 %
10 SYRINGE (ML) INJECTION AS NEEDED
Status: DISCONTINUED | OUTPATIENT
Start: 2023-06-03 | End: 2023-06-03 | Stop reason: HOSPADM

## 2023-06-03 RX ORDER — ONDANSETRON 2 MG/ML
4 INJECTION INTRAMUSCULAR; INTRAVENOUS ONCE
Status: COMPLETED | OUTPATIENT
Start: 2023-06-03 | End: 2023-06-03

## 2023-06-03 RX ORDER — ACETAMINOPHEN 500 MG
1000 TABLET ORAL ONCE
Status: COMPLETED | OUTPATIENT
Start: 2023-06-03 | End: 2023-06-03

## 2023-06-03 RX ORDER — ONDANSETRON 4 MG/1
4 TABLET, ORALLY DISINTEGRATING ORAL EVERY 6 HOURS PRN
Qty: 20 TABLET | Refills: 0 | Status: SHIPPED | OUTPATIENT
Start: 2023-06-03

## 2023-06-03 RX ADMIN — PIPERACILLIN AND TAZOBACTAM 3.38 G: 3; .375 INJECTION, POWDER, LYOPHILIZED, FOR SOLUTION INTRAVENOUS at 20:04

## 2023-06-03 RX ADMIN — MORPHINE SULFATE 4 MG: 2 INJECTION, SOLUTION INTRAMUSCULAR; INTRAVENOUS at 20:03

## 2023-06-03 RX ADMIN — SODIUM CHLORIDE 1905 ML: 9 INJECTION, SOLUTION INTRAVENOUS at 20:03

## 2023-06-03 RX ADMIN — ONDANSETRON 4 MG: 2 INJECTION INTRAMUSCULAR; INTRAVENOUS at 20:03

## 2023-06-03 RX ADMIN — IOPAMIDOL 100 ML: 755 INJECTION, SOLUTION INTRAVENOUS at 20:02

## 2023-06-03 RX ADMIN — ACETAMINOPHEN 1000 MG: 500 TABLET ORAL at 19:03

## 2023-06-03 NOTE — FSED PROVIDER NOTE
Subjective   History of Present Illness  43-year-old female with past medical history of frequent UTIs presents to the emergency department with 1 day of abdominal pain.  She reports pain started last night.  She reports pain started generalized/right upper quadrant, however  now has migrated to her right lower quadrant.  She denies any history of abdominal surgeries.  Last bowel movement was this afternoon, no diarrhea or blood in stool.  She has not yet taken any medications for symptoms.  She also notes that she has a fever.  No known sick contacts or recent travel.  No other complaints at this time.  She denies nausea or vomiting.    History provided by:  Patient    Review of Systems   Constitutional:  Negative for chills and fever.   HENT:  Negative for congestion and sore throat.    Eyes:  Negative for pain and redness.   Respiratory:  Negative for cough and shortness of breath.    Cardiovascular:  Negative for chest pain and palpitations.   Gastrointestinal:  Positive for abdominal pain. Negative for nausea and vomiting.   Genitourinary:  Negative for difficulty urinating and dysuria.   Musculoskeletal:  Negative for back pain and neck pain.   Skin:  Negative for rash and wound.   Neurological:  Negative for weakness, numbness and headaches.   All other systems reviewed and are negative.    Past Medical History:   Diagnosis Date    Allergic Always    Asthma 2019    Frequent UTI     History of irregular menstrual cycles     Irregular menses     Left lower quadrant pain     PPD positive     TB gold test is negative    SAB (spontaneous )     Swollen lymph nodes     Vaginal delivery        Allergies   Allergen Reactions    Prednisone Other (See Comments)     Blurred vision       History reviewed. No pertinent surgical history.    Family History   Problem Relation Age of Onset    Hypertension Father     Coronary artery disease Maternal Grandmother     Heart attack Maternal Grandmother     Alzheimer's  disease Maternal Grandfather     Hyperlipidemia Mother     No Known Problems Paternal Grandfather     No Known Problems Paternal Grandmother     No Known Problems Maternal Aunt     No Known Problems Maternal Uncle     No Known Problems Paternal Aunt     No Known Problems Paternal Uncle        Social History     Socioeconomic History    Marital status:    Tobacco Use    Smoking status: Former     Packs/day: 0.50     Years: 15.00     Pack years: 7.50     Types: Cigarettes     Start date: 4/15/1997     Quit date: 2012     Years since quittin.2    Smokeless tobacco: Never   Vaping Use    Vaping Use: Never used   Substance and Sexual Activity    Alcohol use: Yes     Alcohol/week: 7.0 standard drinks     Types: 3 Glasses of wine, 4 Cans of beer per week    Sexual activity: Yes     Partners: Male     Birth control/protection: None           Objective   Physical Exam  Vitals and nursing note reviewed.   Constitutional:       General: She is not in acute distress.     Appearance: Normal appearance.   HENT:      Head: Normocephalic and atraumatic.      Mouth/Throat:      Mouth: Mucous membranes are moist.   Eyes:      Extraocular Movements: Extraocular movements intact.      Conjunctiva/sclera: Conjunctivae normal.      Pupils: Pupils are equal, round, and reactive to light.   Cardiovascular:      Rate and Rhythm: Normal rate and regular rhythm.      Pulses: Normal pulses.      Heart sounds: Normal heart sounds.   Pulmonary:      Effort: Pulmonary effort is normal. No respiratory distress.      Breath sounds: Normal breath sounds.   Abdominal:      General: There is no distension.      Palpations: Abdomen is soft.      Tenderness: There is no abdominal tenderness in the right upper quadrant and right lower quadrant. There is no guarding or rebound. Negative signs include Love's sign.   Musculoskeletal:         General: Normal range of motion.      Cervical back: Normal range of motion and neck supple.    Skin:     General: Skin is warm.   Neurological:      General: No focal deficit present.      Mental Status: She is alert.   Psychiatric:         Mood and Affect: Mood normal.         Behavior: Behavior normal.       Procedures           ED Course  ED Course as of 06/03/23 2125   Sat Jun 03, 2023 2114 EXAMINATION: CT OF THE ABDOMEN AND PELVIS WITH CONTRAST     HISTORY: 43-year-old female with a history of right lower quadrant  abdominal pain     TECHNIQUE: Contiguous axial images were obtained through the abdomen and  pelvis following intravenous administration of nonionic contrast. Oral  contrast  was not administered. Radiation dose reduction techniques were  utilized, including automated exposure control and exposure modulation  based on body size.     COMPARISON: None     FINDINGS: The visualized portion of the lung bases are clear. The  visualized portion of the heart has a normal appearance . The liver  appears normal. The gallbladder has normal.. The pancreas appears  normal. The right kidney has a normal appearance. There is a 0.5 cm  hypodensity in the left kidney that is too small to characterize but  likely represents a cyst. No other abnormality of the left kidney is  appreciated. The adrenal glands appear normal. The spleen has a normal  appearance. The bowel appears normal. A normal appendix is visualized. A  scant amount of free fluid is seen within the pelvis. The right ovary is  not certainly identified. The left ovary has a normal appearance. There  is a 1.7 cm calcified subserosal anteriorly located uterine lesion  consistent with a fibroid. Scattered mild heterogeneity that is  nonspecific is seen throughout the uterus. The osseous structures of the  lumbar spine and pelvis appear normal.     IMPRESSION:  1.A 1.7 cm partially calcified subserosal uterine fibroid is visualized.  Nonspecific mild heterogeneity of the uterus is noted. While this is  nonspecific, other fibroids and/or uterine  adenomyosis cannot be  excluded. A small amount of free fluid within the pelvic cul-de-sac is  noted. The left ovary appears normal but the right ovary is not  visualized.  2. Normal appearance of the appendix. [JS]   2128 Patient reports her symptoms have completely resolved.  On repeat examination, she has no abdominal tenderness.  Tolerating p.o. in the emergency department.  Counseled to follow-up closely with a primary care physician.  Return to the ED for any new or worsening symptoms, particularly intractable vomiting, worsening abdominal pain, intractable fever, or any other concerning symptoms.  Patient expressed understanding and agreement with this plan.  Patient discharged home. [JS]      ED Course User Index  [JS] Abelardo Diamond MD                                           Medical Decision Making  43-year-old female presents to the emergency department with abdominal pain.  Differential diagnosis includes cholecystitis, appendicitis, colitis, pyelonephritis, UTI, viral gastroenteritis, others.  Patient presents with fever and tachycardia.  Given this, patient meets SIRS criteria.  Blood cultures drawn as well as lactic acid.  Patient given Zosyn for presumed intra-abdominal infection.    Problems Addressed:  Abdominal pain, unspecified abdominal location: acute illness or injury  Fever, unspecified fever cause: acute illness or injury  Fibroid: self-limited or minor problem    Amount and/or Complexity of Data Reviewed  Labs: ordered.  Radiology: ordered.    Risk  OTC drugs.  Prescription drug management.        Final diagnoses:   Abdominal pain, unspecified abdominal location   Fever, unspecified fever cause   Fibroid       ED Disposition  ED Disposition       ED Disposition   Discharge    Condition   Stable    Comment   --               Dior Medina MD  [address] [facility] [address]  [place] [id number] [phone]    Schedule an appointment as soon as possible for a visit in 3  days      [facility]  [address]  [facility] [address]    As needed, If symptoms worsen         Medication List        New Prescriptions      dicyclomine 10 MG capsule  Commonly known as: BENTYL  Take 1 capsule by mouth 3 (Three) Times a Day As Needed (Abdominal spasms).     ondansetron ODT 4 MG disintegrating tablet  Commonly known as: ZOFRAN-ODT  Place 1 tablet on the tongue Every 6 (Six) Hours As Needed for Nausea or Vomiting.               Where to Get Your Medications        These medications were sent to [id number] - [place], [place] - [address] AT SEC OF [facility] & [address] - [phone]  - [phone] FX  [address] [place] [address]      Phone: [phone]   dicyclomine 10 MG capsule  ondansetron ODT 4 MG disintegrating tablet

## 2023-06-08 LAB — BACTERIA SPEC AEROBE CULT: NORMAL

## 2023-10-04 ENCOUNTER — OFFICE VISIT (OUTPATIENT)
Dept: FAMILY MEDICINE CLINIC | Facility: CLINIC | Age: 43
End: 2023-10-04
Payer: COMMERCIAL

## 2023-10-04 VITALS
SYSTOLIC BLOOD PRESSURE: 144 MMHG | TEMPERATURE: 98.4 F | OXYGEN SATURATION: 100 % | HEART RATE: 90 BPM | HEIGHT: 65 IN | DIASTOLIC BLOOD PRESSURE: 88 MMHG | BODY MASS INDEX: 23.49 KG/M2 | WEIGHT: 141 LBS

## 2023-10-04 DIAGNOSIS — F41.1 GENERALIZED ANXIETY DISORDER: Primary | ICD-10-CM

## 2023-10-04 PROCEDURE — 99213 OFFICE O/P EST LOW 20 MIN: CPT | Performed by: FAMILY MEDICINE

## 2023-10-04 RX ORDER — ESCITALOPRAM OXALATE 5 MG/1
5 TABLET ORAL DAILY
Qty: 30 TABLET | Refills: 11 | Status: SHIPPED | OUTPATIENT
Start: 2023-10-04

## 2023-10-04 NOTE — PROGRESS NOTES
Subjective   Jimena Ferguson is a 43 y.o. female.     History of Present Illness  Here with concerns about anxiety     Ms. Jimena Ferguson is a 43-year-old female who presents today with complaints of anxiety. She feels like it is getting worse.    The patient has had anxiety for years, and it is not getting better. She was going to [employer], and they are studying to be therapists. Her therapist graduated, and she is no longer seeing her. She was going through therapy, and it was slightly effective. They discussed her being on a low dose of medication. However, she does not like being on medication unless she absolutely must. Her  is also a , and he works 24 hours on and 24 hours. She gets nervous at night when he is not there, and she is alone. She gets nervous that something bad is going to happen to him as well. She is up all night worrying about that as well. She still gets up and goes to work and does everything she needs to do, but it makes it hard when she is tired because she has been worrying. She rates her anxiety as a 6 out of 10 daily, and at night it is a 9 or 10. She is ready to go on a low-dose medication. When she was younger, she took a couple of antidepressants, and they did not go well. One was Wellbutrin, and the other was Paxil. She had side effects with the Wellbutrin, but they were not as bad as the Paxil. She denies feeling depressed or wanting to hurt herself or anyone else. She has not tried acupuncture.    The following portions of the patient's history were reviewed and updated as appropriate: allergies, current medications, past family history, past medical history, past social history, past surgical history, and problem list.  Past Medical History:   Diagnosis Date    Allergic Always    Asthma 2019    Frequent UTI     History of irregular menstrual cycles     Irregular menses     Left lower quadrant pain     PPD positive     TB gold test is negative    SAB (spontaneous  )     Swollen lymph nodes     Vaginal delivery      No past surgical history on file.  Family History   Problem Relation Age of Onset    Hypertension Father     Coronary artery disease Maternal Grandmother     Heart attack Maternal Grandmother     Alzheimer's disease Maternal Grandfather     Hyperlipidemia Mother     No Known Problems Paternal Grandfather     No Known Problems Paternal Grandmother     No Known Problems Maternal Aunt     No Known Problems Maternal Uncle     No Known Problems Paternal Aunt     No Known Problems Paternal Uncle      Social History     Socioeconomic History    Marital status:    Tobacco Use    Smoking status: Former     Packs/day: 0.50     Years: 15.00     Pack years: 7.50     Types: Cigarettes     Start date: 4/15/1997     Quit date: 2012     Years since quittin.6    Smokeless tobacco: Never   Vaping Use    Vaping Use: Never used   Substance and Sexual Activity    Alcohol use: Yes     Alcohol/week: 7.0 standard drinks     Types: 3 Glasses of wine, 4 Cans of beer per week    Drug use: Never    Sexual activity: Yes     Partners: Male     Birth control/protection: None         Current Outpatient Medications:     albuterol sulfate  (90 Base) MCG/ACT inhaler, Inhale 2 puffs Every 4 (Four) Hours As Needed for Wheezing., Disp: 18 g, Rfl: 2    Crisaborole (EUCRISA) 2 % ointment, Apply 1 dose topically 2 (Two) Times a Day., Disp: 30 g, Rfl: 1    fluticasone (FLONASE) 50 MCG/ACT nasal spray, 2 sprays into the nostril(s) as directed by provider Daily., Disp: , Rfl:     budesonide-formoterol (Symbicort) 160-4.5 MCG/ACT inhaler, Inhale 2 puffs 2 (Two) Times a Day. (Patient not taking: Reported on 10/4/2023), Disp: 10.2 g, Rfl: 3    dicyclomine (BENTYL) 10 MG capsule, Take 1 capsule by mouth 3 (Three) Times a Day As Needed (Abdominal spasms). (Patient not taking: Reported on 10/4/2023), Disp: 30 capsule, Rfl: 0    erythromycin (ROMYCIN) 5 MG/GM ophthalmic ointment,  "Administer  to the right eye Every 4 (Four) Hours While Awake. (Patient not taking: Reported on 10/4/2023), Disp: 3.5 g, Rfl: 0    escitalopram (Lexapro) 5 MG tablet, Take 1 tablet by mouth Daily., Disp: 30 tablet, Rfl: 11    montelukast (Singulair) 10 MG tablet, Take 1 tablet by mouth Every Night. (Patient not taking: Reported on 10/4/2023), Disp: 30 tablet, Rfl: 5    ondansetron ODT (ZOFRAN-ODT) 4 MG disintegrating tablet, Place 1 tablet on the tongue Every 6 (Six) Hours As Needed for Nausea or Vomiting. (Patient not taking: Reported on 10/4/2023), Disp: 20 tablet, Rfl: 0    Review of Systems  /88 (BP Location: Left arm, Patient Position: Sitting, Cuff Size: Adult)   Pulse 90   Temp 98.4 °F (36.9 °C) (Temporal)   Ht 165.1 cm (65\")   Wt 64 kg (141 lb)   SpO2 100%   BMI 23.46 kg/m²   BMI is within normal parameters. No other follow-up for BMI required.     A review of systems was performed, and the pertinent positives are noted in the HPI.     Objective   Physical Exam  Vitals and nursing note reviewed.   Constitutional:       General: She is not in acute distress.     Appearance: She is well-developed.   HENT:      Head: Normocephalic and atraumatic.   Neck:      Thyroid: No thyromegaly.   Cardiovascular:      Rate and Rhythm: Normal rate and regular rhythm.      Heart sounds: Normal heart sounds. No murmur heard.    No friction rub. No gallop.   Pulmonary:      Effort: Pulmonary effort is normal. No respiratory distress.      Breath sounds: Normal breath sounds. No wheezing or rales.   Musculoskeletal:      Cervical back: Neck supple.   Lymphadenopathy:      Cervical: No cervical adenopathy.   Skin:     General: Skin is warm and dry.   Neurological:      Mental Status: She is alert.   Psychiatric:         Mood and Affect: Mood is anxious.         Assessment & Plan   Problems Addressed this Visit          Mental Health    Generalized anxiety disorder - Primary    Relevant Medications    escitalopram " (Lexapro) 5 MG tablet     Diagnoses         Codes Comments    Generalized anxiety disorder    -  Primary ICD-10-CM: F41.1  ICD-9-CM: 300.02         1. Generalized anxiety disorder  - Some of it seems to be situational, and some of it seems more generalized.  - I will try her on a low dose of Lexapro 5 mg.  - I have given her strict instructions, or at least I have recommended that she have her  read the product insert and look at the side effects.  - I have explained to her that some side effects are temporary and that most go away within 2 weeks.  - She voiced understanding, and she agrees that she thinks it would be a good idea for her  to read the product insert instead of herself.  - She did not want an influenza shot today.      Transcribed from ambient dictation for Dior Medina MD by Aissatou Whitmore.  10/04/23   14:46 EDT    Patient or patient representative verbalized consent to the visit recording.  I have personally performed the services described in this document as transcribed by the above individual, and it is both accurate and complete.

## 2023-11-26 ENCOUNTER — HOSPITAL ENCOUNTER (EMERGENCY)
Facility: HOSPITAL | Age: 43
Discharge: HOME | End: 2023-11-26
Attending: EMERGENCY MEDICINE | Admitting: EMERGENCY MEDICINE
Payer: COMMERCIAL

## 2023-11-26 ENCOUNTER — APPOINTMENT (OUTPATIENT)
Dept: CT IMAGING | Facility: HOSPITAL | Age: 43
End: 2023-11-26
Payer: COMMERCIAL

## 2023-11-26 VITALS
WEIGHT: 145.5 LBS | HEIGHT: 65 IN | DIASTOLIC BLOOD PRESSURE: 81 MMHG | TEMPERATURE: 98.1 F | BODY MASS INDEX: 24.24 KG/M2 | RESPIRATION RATE: 18 BRPM | HEART RATE: 95 BPM | SYSTOLIC BLOOD PRESSURE: 126 MMHG | OXYGEN SATURATION: 97 %
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
## 2023-11-26 DIAGNOSIS — S16.1XXA STRAIN OF NECK MUSCLE, INITIAL ENCOUNTER: ICD-10-CM

## 2023-11-26 DIAGNOSIS — S30.0XXA CONTUSION OF BUTTOCK, INITIAL ENCOUNTER: ICD-10-CM

## 2023-11-26 DIAGNOSIS — S09.90XA CLOSED HEAD INJURY, INITIAL ENCOUNTER: ICD-10-CM

## 2023-11-26 PROCEDURE — 72125 CT NECK SPINE W/O DYE: CPT

## 2023-11-26 PROCEDURE — 99284 EMERGENCY DEPT VISIT MOD MDM: CPT

## 2023-11-26 PROCEDURE — 70450 CT HEAD/BRAIN W/O DYE: CPT

## 2023-11-26 NOTE — ED PROVIDER NOTES
Subjective     Provider in Triage Note  Due to significant overcrowding in the emergency department patient was initially seen and evaluated in triage.  Provider in triage recommended patient placement in the treatment area to initiate therapy and movement to an ER bed as soon as possible.    Patient is a 43-year-old white female who presents today with complaints of a head injury.  She states she slipped while she was doing dishes and fell.  She states her head struck the wall she put a hole in the wall.  She denies loss of consciousness.  No blood thinner use.  Complains of a headache.  No dizziness or visual changes.      History of Present Illness    Review of Systems   Constitutional:  Negative for fever.   HENT:  Negative for congestion.    Eyes:  Negative for visual disturbance.   Respiratory:  Negative for cough and shortness of breath.    Cardiovascular:  Negative for chest pain.   Gastrointestinal:  Negative for abdominal pain.   Musculoskeletal:  Positive for neck pain. Negative for back pain.   Neurological:  Positive for headaches. Negative for dizziness, weakness and numbness.   Psychiatric/Behavioral:  Negative for confusion.        Past Medical History:   Diagnosis Date    Allergic Always    Asthma 2019    Frequent UTI     History of irregular menstrual cycles     Irregular menses     Left lower quadrant pain     PPD positive     TB gold test is negative    SAB (spontaneous )     Swollen lymph nodes     Vaginal delivery        Allergies   Allergen Reactions    Prednisone Other (See Comments)     Blurred vision       No past surgical history on file.    Family History   Problem Relation Age of Onset    Hypertension Father     Coronary artery disease Maternal Grandmother     Heart attack Maternal Grandmother     Alzheimer's disease Maternal Grandfather     Hyperlipidemia Mother     No Known Problems Paternal Grandfather     No Known Problems Paternal Grandmother     No Known Problems Maternal  "Aunt     No Known Problems Maternal Uncle     No Known Problems Paternal Aunt     No Known Problems Paternal Uncle        Social History     Socioeconomic History    Marital status:    Tobacco Use    Smoking status: Former     Packs/day: 0.50     Years: 15.00     Additional pack years: 0.00     Total pack years: 7.50     Types: Cigarettes     Start date: 4/15/1997     Quit date: 2012     Years since quittin.7    Smokeless tobacco: Never   Vaping Use    Vaping Use: Never used   Substance and Sexual Activity    Alcohol use: Yes     Alcohol/week: 7.0 standard drinks of alcohol     Types: 3 Glasses of wine, 4 Cans of beer per week    Drug use: Never    Sexual activity: Yes     Partners: Male     Birth control/protection: None       /80   Pulse 91   Temp 97.9 °F (36.6 °C) (Oral)   Resp 18   Ht 165.1 cm (65\")   Wt 66 kg (145 lb 8.1 oz)   SpO2 100%   BMI 24.21 kg/m²       Objective   Physical Exam  Vitals and nursing note reviewed.   Constitutional:       Appearance: Normal appearance.   HENT:      Head: Normocephalic and atraumatic.      Mouth/Throat:      Mouth: Mucous membranes are moist.   Neck:      Comments: Mild tenderness to the posterior neck.  No visible injury or deformity.  Cardiovascular:      Rate and Rhythm: Normal rate and regular rhythm.      Heart sounds: Normal heart sounds.   Pulmonary:      Effort: Pulmonary effort is normal. No respiratory distress.      Breath sounds: Normal breath sounds.   Abdominal:      Palpations: Abdomen is soft.      Tenderness: There is no abdominal tenderness.   Musculoskeletal:      Comments: There is tenderness to the sacrum and coccyx area.  No deformity.  Extremities unremarkable.   Skin:     General: Skin is warm and dry.   Neurological:      General: No focal deficit present.      Mental Status: She is alert and oriented to person, place, and time.         Procedures           ED Course      CT Head Without Contrast    Result Date: " 11/26/2023  Impression: CT HEAD: 1. No acute intracranial abnormality. 2. Sinus fluid levels in the maxillary sinuses, correlate for findings of sinusitis. CT CERVICAL SPINE: 1. Negative for cervical spine fracture. 2. Cervical degenerative findings above most pronounced at the C5-6 and C6-7. Electronically Signed: Dannie Parnell MD  11/26/2023 7:42 PM EST  Workstation ID: [id number]    CT Cervical Spine Without Contrast    Result Date: 11/26/2023  Impression: CT HEAD: 1. No acute intracranial abnormality. 2. Sinus fluid levels in the maxillary sinuses, correlate for findings of sinusitis. CT CERVICAL SPINE: 1. Negative for cervical spine fracture. 2. Cervical degenerative findings above most pronounced at the C5-6 and C6-7. Electronically Signed: Dannie Parnell MD  11/26/2023 7:42 PM EST  Workstation ID: [id number]                                        Medical Decision Making  Amount and/or Complexity of Data Reviewed  Radiology: ordered.      CT head showed no acute intracranial abnormality.  CT cervical spine showed no fracture.  Patient is well-appearing on exam.  She does have some tenderness in the sacrum and coccyx area however she has declined x-ray.  She is stable for discharge.      Final diagnoses:   Fall, initial encounter   Closed head injury, initial encounter   Strain of neck muscle, initial encounter   Contusion of buttock, initial encounter       ED Disposition  ED Disposition       ED Disposition   Discharge    Condition   Stable    Comment   --               Dior Meidna MD  [address] [facility] [address]  [place] [address]  [phone]    Call   As needed         Medication List      No changes were made to your prescriptions during this visit.            Philipp Roberts MD  11/26/23 [id number]

## 2023-11-27 NOTE — ED NOTES
Pt was washing dishes and slipped back on floor and hit occipital region of head. Pt denies losing consciousness. Pt now complaining of head and tailbone pain

## 2024-01-15 ENCOUNTER — OFFICE VISIT (OUTPATIENT)
Dept: FAMILY MEDICINE CLINIC | Facility: CLINIC | Age: 44
End: 2024-01-15
Payer: COMMERCIAL

## 2024-01-15 VITALS
HEART RATE: 71 BPM | TEMPERATURE: 98.5 F | DIASTOLIC BLOOD PRESSURE: 88 MMHG | SYSTOLIC BLOOD PRESSURE: 136 MMHG | OXYGEN SATURATION: 100 % | HEIGHT: 65 IN | BODY MASS INDEX: 24.16 KG/M2 | WEIGHT: 145 LBS

## 2024-01-15 DIAGNOSIS — R22.0 NASAL SWELLING: ICD-10-CM

## 2024-01-15 DIAGNOSIS — J34.89 NASAL MASS: Primary | ICD-10-CM

## 2024-01-15 DIAGNOSIS — J34.89 NASAL SORE: ICD-10-CM

## 2024-01-15 PROCEDURE — 99213 OFFICE O/P EST LOW 20 MIN: CPT

## 2024-01-15 NOTE — PROGRESS NOTES
"Chief Complaint  No chief complaint on file.    Subjective        Jimena Ferguson presents to [facility]  History of Present Illness    Pt is here today for what she thinks is a growth in her left nostril.  She noticed this about 1 month ago. It has been mildly bleeding off and on. She stopped flonase due to nose bleeds. Her left nostril feels irritated and stuffy. She denies sinus pain, fever, headache.    Objective   Vital Signs:  /88 (BP Location: Left arm, Patient Position: Sitting, Cuff Size: Adult)   Pulse 71   Temp 98.5 °F (36.9 °C) (Temporal)   Ht 165.1 cm (65\")   Wt 65.8 kg (145 lb)   SpO2 100%   BMI 24.13 kg/m²   Estimated body mass index is 24.13 kg/m² as calculated from the following:    Height as of this encounter: 165.1 cm (65\").    Weight as of this encounter: 65.8 kg (145 lb).    BMI is within normal parameters. No other follow-up for BMI required.            Physical Exam  Vitals reviewed.   Constitutional:       General: She is not in acute distress.     Appearance: Normal appearance. She is not ill-appearing, toxic-appearing or diaphoretic.   HENT:      Nose:      Left Turbinates: Enlarged.      Right Sinus: No maxillary sinus tenderness or frontal sinus tenderness.      Left Sinus: No maxillary sinus tenderness or frontal sinus tenderness.   Cardiovascular:      Rate and Rhythm: Normal rate and regular rhythm.      Pulses: Normal pulses.      Heart sounds: Normal heart sounds.   Pulmonary:      Effort: Pulmonary effort is normal. No respiratory distress.      Breath sounds: Normal breath sounds.   Skin:     General: Skin is warm and dry.      Capillary Refill: Capillary refill takes less than 2 seconds.   Neurological:      General: No focal deficit present.      Mental Status: She is alert and oriented to person, place, and time.   Psychiatric:         Mood and Affect: Mood normal.         Behavior: Behavior normal.         Thought Content: Thought " content normal.         Judgment: Judgment normal.        Result Review :                Assessment and Plan   Diagnoses and all orders for this visit:    1. Nasal mass (Primary)  -     Ambulatory Referral to ENT (Otolaryngology)    2. Nasal sore  Comments:  -saline nasal spray  Orders:  -     Ambulatory Referral to ENT (Otolaryngology)    3. Nasal swelling  -     Ambulatory Referral to ENT (Otolaryngology)             Follow Up   Return if symptoms worsen or fail to improve.  Patient was given instructions and counseling regarding her condition or for health maintenance advice. Please see specific information pulled into the AVS if appropriate.

## 2024-03-08 ENCOUNTER — OFFICE VISIT (OUTPATIENT)
Dept: OBSTETRICS AND GYNECOLOGY | Age: 44
End: 2024-03-08
Payer: COMMERCIAL

## 2024-03-08 VITALS
WEIGHT: 148 LBS | HEIGHT: 65 IN | DIASTOLIC BLOOD PRESSURE: 74 MMHG | BODY MASS INDEX: 24.66 KG/M2 | SYSTOLIC BLOOD PRESSURE: 132 MMHG

## 2024-03-08 DIAGNOSIS — Z12.4 SCREENING FOR CERVICAL CANCER: ICD-10-CM

## 2024-03-08 DIAGNOSIS — Z01.419 WELL WOMAN EXAM WITH ROUTINE GYNECOLOGICAL EXAM: Primary | ICD-10-CM

## 2024-03-08 DIAGNOSIS — N93.9 ABNORMAL UTERINE BLEEDING (AUB): ICD-10-CM

## 2024-03-08 DIAGNOSIS — Z12.31 ENCOUNTER FOR SCREENING MAMMOGRAM FOR MALIGNANT NEOPLASM OF BREAST: ICD-10-CM

## 2024-03-08 NOTE — PROGRESS NOTES
Subjective     Chief Complaint   Patient presents with    Gynecologic Exam     AE, last pap 10/25/2021 neg/hpv neg, mg 10/25/2021  Cc: Heavy and longer periods       History of Present Illness    Jimena Ferguson is a 43 y.o.  who presents for annual exam.    Doing well  Last seen 10/2021  Due for pap   She states in December she had a period and then had another period two weeks later. This period lasted a month.   She had CT scan over the summer that did show she had a small subserosal fibroid  Her mother had a hysterectomy around age 40 for heavy bleeding  Soc - works as a caregiver, takes care of three different individuals     Obstetric History:  OB History          3    Para   1    Term                AB   2    Living   1         SAB   2    IAB        Ectopic        Molar        Multiple        Live Births   1          Obstetric Comments   BALDO              Menstrual History:     Patient's last menstrual period was 2024.         Current contraception: none  History of abnormal Pap smear: no  Received Gardasil immunization: no  Perform regular self breast exam: yes - .  Family history of uterine or ovarian cancer: no  Family History of colon cancer: no  Family history of breast cancer: no    Mammogram: ordered.  Colonoscopy: not indicated.  DEXA: not indicated.    Exercise: moderately active - walks   Calcium/Vitamin D: adequate intake    The following portions of the patient's history were reviewed and updated as appropriate: allergies, current medications, past family history, past medical history, past social history, past surgical history, and problem list.    Review of Systems   Constitutional: Negative.    Respiratory: Negative.     Cardiovascular: Negative.    Gastrointestinal: Negative.    Genitourinary: Negative.    Skin: Negative.    Psychiatric/Behavioral: Negative.             Objective   Physical Exam  Constitutional:       General: She is awake.      Appearance: Normal  appearance. She is well-developed.   HENT:      Head: Normocephalic and atraumatic.      Nose: Nose normal.   Neck:      Thyroid: No thyroid mass, thyromegaly or thyroid tenderness.   Cardiovascular:      Rate and Rhythm: Normal rate and regular rhythm.      Pulses: Normal pulses.      Heart sounds: Normal heart sounds.   Pulmonary:      Effort: Pulmonary effort is normal.      Breath sounds: Normal breath sounds.   Chest:   Breasts:     Breasts are symmetrical.      Right: Normal. No swelling, bleeding, inverted nipple, mass, nipple discharge, skin change or tenderness.      Left: Normal. No swelling, bleeding, inverted nipple, mass, nipple discharge, skin change or tenderness.   Abdominal:      General: Abdomen is flat. Bowel sounds are normal.      Palpations: Abdomen is soft.      Tenderness: There is no abdominal tenderness.   Genitourinary:     General: Normal vulva.      Labia:         Right: No rash, tenderness, lesion or injury.         Left: No rash, tenderness, lesion or injury.       Urethra: No prolapse, urethral pain, urethral swelling or urethral lesion.      Vagina: Normal. No signs of injury. No vaginal discharge, erythema, tenderness, bleeding, lesions or prolapsed vaginal walls.      Cervix: Normal. No discharge, friability, lesion, erythema or cervical bleeding.      Uterus: Normal. Not enlarged, not tender and no uterine prolapse.       Adnexa: Right adnexa normal and left adnexa normal.        Right: No mass, tenderness or fullness.          Left: No mass, tenderness or fullness.        Rectum: Normal. No mass.   Musculoskeletal:      Cervical back: Normal range of motion and neck supple.   Lymphadenopathy:      Upper Body:      Right upper body: No supraclavicular adenopathy.      Left upper body: No supraclavicular adenopathy.   Skin:     General: Skin is warm and dry.   Neurological:      General: No focal deficit present.      Mental Status: She is alert and oriented to person, place, and  "time.   Psychiatric:         Mood and Affect: Mood normal.         Behavior: Behavior normal. Behavior is cooperative.         Thought Content: Thought content normal.         Judgment: Judgment normal.         /74   Ht 165.1 cm (65\")   Wt 67.1 kg (148 lb)   LMP 02/14/2024   BMI 24.63 kg/m²     Assessment & Plan   Diagnoses and all orders for this visit:    1. Well woman exam with routine gynecological exam (Primary)    2. Screening for cervical cancer  -     IGP, Apt HPV,rfx 16 / 18,45    3. Encounter for screening mammogram for malignant neoplasm of breast  -     Mammo Screening Digital Tomosynthesis Bilateral With CAD; Future    4. Abnormal uterine bleeding (AUB)        All questions answered.  Breast self exam technique reviewed and patient encouraged to perform self-exam monthly.  Discussed healthy lifestyle modifications.  Recommended 30 minutes of aerobic exercise five times per week.  Discussed calcium needs to prevent osteoporosis.    -Pap done  -Mammo ordered  -Plan follow up pelvic US for AUB               "

## 2024-03-12 LAB
CYTOLOGIST CVX/VAG CYTO: NORMAL
CYTOLOGY CVX/VAG DOC CYTO: NORMAL
CYTOLOGY CVX/VAG DOC THIN PREP: NORMAL
DX ICD CODE: NORMAL
HPV I/H RISK 4 DNA CVX QL PROBE+SIG AMP: NEGATIVE
Lab: NORMAL
OTHER STN SPEC: NORMAL
STAT OF ADQ CVX/VAG CYTO-IMP: NORMAL

## 2024-03-21 ENCOUNTER — HOSPITAL ENCOUNTER (OUTPATIENT)
Dept: MAMMOGRAPHY | Facility: HOSPITAL | Age: 44
Discharge: HOME | End: 2024-03-21
Admitting: NURSE PRACTITIONER
Payer: COMMERCIAL

## 2024-03-21 DIAGNOSIS — Z12.31 ENCOUNTER FOR SCREENING MAMMOGRAM FOR MALIGNANT NEOPLASM OF BREAST: ICD-10-CM

## 2024-03-21 PROCEDURE — 77067 SCR MAMMO BI INCL CAD: CPT

## 2024-03-21 PROCEDURE — 77063 BREAST TOMOSYNTHESIS BI: CPT

## 2024-03-22 DIAGNOSIS — R92.8 ABNORMAL MAMMOGRAM: Primary | ICD-10-CM

## 2024-03-22 DIAGNOSIS — N64.89 BREAST ASYMMETRY: ICD-10-CM

## 2024-04-02 ENCOUNTER — HOSPITAL ENCOUNTER (OUTPATIENT)
Dept: ULTRASOUND IMAGING | Facility: HOSPITAL | Age: 44
Discharge: HOME | End: 2024-04-02
Payer: COMMERCIAL

## 2024-04-02 ENCOUNTER — HOSPITAL ENCOUNTER (OUTPATIENT)
Dept: MAMMOGRAPHY | Facility: HOSPITAL | Age: 44
Discharge: HOME | End: 2024-04-02
Payer: COMMERCIAL

## 2024-04-08 ENCOUNTER — HOSPITAL ENCOUNTER (OUTPATIENT)
Dept: MAMMOGRAPHY | Facility: HOSPITAL | Age: 44
Discharge: HOME | End: 2024-04-08
Payer: COMMERCIAL

## 2024-04-08 ENCOUNTER — HOSPITAL ENCOUNTER (OUTPATIENT)
Dept: ULTRASOUND IMAGING | Facility: HOSPITAL | Age: 44
Discharge: HOME | End: 2024-04-08
Payer: COMMERCIAL

## 2024-04-08 DIAGNOSIS — R92.8 ABNORMAL MAMMOGRAM: ICD-10-CM

## 2024-04-08 DIAGNOSIS — N64.89 BREAST ASYMMETRY: ICD-10-CM

## 2024-04-08 PROCEDURE — G0279 TOMOSYNTHESIS, MAMMO: HCPCS

## 2024-04-08 PROCEDURE — 76642 ULTRASOUND BREAST LIMITED: CPT

## 2024-04-08 PROCEDURE — 77065 DX MAMMO INCL CAD UNI: CPT

## 2024-05-31 ENCOUNTER — OFFICE VISIT (OUTPATIENT)
Dept: FAMILY MEDICINE CLINIC | Facility: CLINIC | Age: 44
End: 2024-05-31
Payer: COMMERCIAL

## 2024-05-31 VITALS
BODY MASS INDEX: 24.32 KG/M2 | WEIGHT: 146 LBS | DIASTOLIC BLOOD PRESSURE: 84 MMHG | TEMPERATURE: 98.4 F | SYSTOLIC BLOOD PRESSURE: 131 MMHG | HEIGHT: 65 IN | HEART RATE: 77 BPM | OXYGEN SATURATION: 100 %
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
## 2024-05-31 PROCEDURE — 99213 OFFICE O/P EST LOW 20 MIN: CPT | Performed by: FAMILY MEDICINE

## 2024-05-31 RX ORDER — DOXYCYCLINE HYCLATE 100 MG/1
100 CAPSULE ORAL 2 TIMES DAILY
Qty: 20 CAPSULE | Refills: 0 | Status: SHIPPED | OUTPATIENT
Start: 2024-05-31 | End: 2024-06-10

## 2024-05-31 NOTE — PROGRESS NOTES
Subjective   Jimena Ferguson is a 44 y.o. female.     History of Present Illness  The patient presents for evaluation of tick bites.    The patient recounts her initial tick bite occurring approximately 1.5 weeks ago, initially located behind her right knee. Subsequently, she noticed the second tick a few days later. The second tick was located in the left groin on Friday. She denies the presence of a fever, however, she reports feeling fatigued and unwell, accompanied by mild body aches. She denies any unusual headaches. She also denies any rash, but notes a red bertha at the site of the tick bite. The tick bite appears to be showing signs of improvement.   She has a dog.       The following portions of the patient's history were reviewed and updated as appropriate: allergies, current medications, past family history, past medical history, past social history, past surgical history, and problem list.  Past Medical History:   Diagnosis Date    Allergic Always    Anxiety 2020    Asthma 2019    Depression 1997    Frequent UTI     History of irregular menstrual cycles     Irregular menses     Left lower quadrant pain     PPD positive     TB gold test is negative    SAB (spontaneous )     Swollen lymph nodes     Vaginal delivery      No past surgical history on file.  Family History   Problem Relation Age of Onset    Hypertension Father     Hyperlipidemia Father     Coronary artery disease Maternal Grandmother     Heart attack Maternal Grandmother     Alzheimer's disease Maternal Grandfather     Hyperlipidemia Mother     No Known Problems Paternal Grandfather     No Known Problems Paternal Grandmother     No Known Problems Maternal Aunt     No Known Problems Maternal Uncle     No Known Problems Paternal Aunt     No Known Problems Paternal Uncle      Social History     Socioeconomic History    Marital status:    Tobacco Use    Smoking status: Former     Current packs/day: 0.00     Average  "packs/day: 0.5 packs/day for 15.0 years (7.5 ttl pk-yrs)     Types: Cigarettes     Start date: 4/15/1997     Quit date: 2012     Years since quittin.2    Smokeless tobacco: Never   Vaping Use    Vaping status: Never Used   Substance and Sexual Activity    Alcohol use: Yes     Alcohol/week: 7.0 standard drinks of alcohol     Types: 3 Glasses of wine, 4 Cans of beer per week    Drug use: Never    Sexual activity: Yes     Partners: Male     Birth control/protection: None         Current Outpatient Medications:     albuterol sulfate  (90 Base) MCG/ACT inhaler, Inhale 2 puffs Every 4 (Four) Hours As Needed for Wheezing., Disp: 18 g, Rfl: 2    budesonide-formoterol (Symbicort) 160-4.5 MCG/ACT inhaler, Inhale 2 puffs 2 (Two) Times a Day., Disp: 10.2 g, Rfl: 3    Crisaborole (EUCRISA) 2 % ointment, Apply 1 dose topically 2 (Two) Times a Day., Disp: 30 g, Rfl: 1    fluticasone (FLONASE) 50 MCG/ACT nasal spray, 2 sprays into the nostril(s) as directed by provider Daily., Disp: , Rfl:     doxycycline (VIBRAMYCIN) 100 MG capsule, Take 1 capsule by mouth 2 (Two) Times a Day for 10 days., Disp: 20 capsule, Rfl: 0    Review of Systems  ROS done and noted in HPI    /84 (BP Location: Right arm, Patient Position: Sitting, Cuff Size: Adult)   Pulse 77   Temp 98.4 °F (36.9 °C) (Temporal)   Ht 165.1 cm (65\")   Wt 66.2 kg (146 lb)   SpO2 100%   BMI 24.30 kg/m²   BMI is within normal parameters. No other follow-up for BMI required.       Objective   Physical Exam  Vitals and nursing note reviewed.   Constitutional:       Appearance: Normal appearance. She is normal weight.   Cardiovascular:      Rate and Rhythm: Normal rate and regular rhythm.      Heart sounds: Normal heart sounds.   Pulmonary:      Effort: Pulmonary effort is normal.      Breath sounds: Normal breath sounds.   Musculoskeletal:      Cervical back: Neck supple.      Right lower leg: No edema.      Left lower leg: No edema.   Lymphadenopathy: "      Cervical: No cervical adenopathy.   Skin:     Findings: No rash.          Neurological:      Mental Status: She is alert.       Physical Exam         Results         Assessment & Plan   Problems Addressed this Visit    None  Visit Diagnoses       Tick bite, unspecified site, initial encounter    -  Primary          Diagnoses         Codes Comments    Tick bite, unspecified site, initial encounter    -  Primary ICD-10-CM: W57.XXXA  ICD-9-CM: 919.4, E906.4           Assessment & Plan  1. Tick bite.   Doxycycline has been prescribed.  She requested not to do any blood work at this time.  The patient has been advised to persist with the application of the antibiotic ointment. Should there be any changes in her condition, such as fever, rash, or sudden neck swelling, she is advised to seek immediate medical attention.            Patient or patient representative verbalized consent for the use of Ambient Listening during the visit with  Dior Medina MD for chart documentation. 5/31/2024  14:45 EDT

## 2024-11-07 ENCOUNTER — OFFICE VISIT (OUTPATIENT)
Dept: OBSTETRICS AND GYNECOLOGY | Age: 44
End: 2024-11-07
Payer: COMMERCIAL

## 2024-11-07 VITALS
SYSTOLIC BLOOD PRESSURE: 122 MMHG | DIASTOLIC BLOOD PRESSURE: 74 MMHG | HEIGHT: 65 IN | BODY MASS INDEX: 24.32 KG/M2 | WEIGHT: 146 LBS

## 2024-11-07 DIAGNOSIS — D25.9 UTERINE LEIOMYOMA, UNSPECIFIED LOCATION: ICD-10-CM

## 2024-11-07 DIAGNOSIS — N93.9 ABNORMAL UTERINE BLEEDING (AUB): Primary | ICD-10-CM

## 2024-11-07 DIAGNOSIS — Z01.812 PRE-PROCEDURE LAB EXAM: ICD-10-CM

## 2024-11-07 LAB
B-HCG UR QL: NEGATIVE
EXPIRATION DATE: NORMAL
INTERNAL NEGATIVE CONTROL: NORMAL
INTERNAL POSITIVE CONTROL: NORMAL
Lab: NORMAL

## 2024-11-07 NOTE — PROGRESS NOTES
"Subjective     Chief Complaint   Patient presents with    Vaginal Bleeding     Vaginal bleeding with clots, pt has been bleeding for months,  US today       Jimena Ferguson is a 44 y.o.  whose LMP is No LMP recorded.     Has been bleeding irregularly for about a year  It has gotten progressively heavier with large clots the size of her hand  She is usually having two periods per month and at her heaviest she is changing out a tampon every hour  She sometimes has to wear an adult diaper  Her mother had a hysterectomy in her 40's for heavy bleeding  Pt to see Dr Ramon     No Additional Complaints Reported    The following portions of the patient's history were reviewed and updated as appropriate:vital signs, allergies, current medications, past medical history, past social history, past surgical history, and problem list      Review of Systems   Pertinent items are noted in HPI.     Objective      /74   Ht 165.1 cm (65\")   Wt 66.2 kg (146 lb)   BMI 24.30 kg/m²     Physical Exam    General:   alert and no distress   Heart: Not performed today   Lungs: Not performed today.   Breast: Not performed today   Neck: na   Abdomen: {Not performed today   CVA: Not performed today   Pelvis: External genitalia: normal general appearance  Urinary system: urethral meatus normal  Vaginal: normal without tenderness, induration or masses, normal rugae, and cystocele present. Blood on exam, pt on menses.  Cervix: normal appearance   Extremities: Not performed today   Neurologic: negative   Psychiatric: Normal affect, judgement, and mood       Lab Review   Labs: No data reviewed     Imaging   Ultrasound - Pelvic Vaginal    Assessment & Plan     ASSESSMENT  1. Abnormal uterine bleeding (AUB)    2. Uterine leiomyoma, unspecified location    3. Pre-procedure lab exam        PLAN  1.   Orders Placed This Encounter   Procedures    POC Pregnancy, Urine    CBC & Differential       2. Medications prescribed this encounter:      No " orders of the defined types were placed in this encounter.      3. Check CBC. EMB completed. Pt tolerated well. Will plan f/u in 1 week to discuss results and for hysterectomy consult.    Follow up: 1 week(s)    NINA Hodge  11/7/2024    Endometrial Biopsy    Date of procedure:  11/7/2024    Procedure documentation:    The cervix was grasped anterior at the 12 o'clock position.  The cavity sounded to 9 centimeters.  An endometrial biopsy specimen was obtained and multiple passes.  The tissue was sent for permanent histopathologic evaluation.  Tenaculum was removed from the cervix with scant bleeding.    Post procedure instructions: She was instructed to call us in 1 week's time if she has not heard from us otherwise.  If there is any significant fever or excessive bleeding or pain she is to call immediately.

## 2024-11-08 LAB
BASOPHILS # BLD AUTO: 0.06 10*3/MM3 (ref 0–0.2)
BASOPHILS NFR BLD AUTO: 0.8 % (ref 0–1.5)
EOSINOPHIL # BLD AUTO: 0.17 10*3/MM3 (ref 0–0.4)
EOSINOPHIL NFR BLD AUTO: 2.2 % (ref 0.3–6.2)
ERYTHROCYTE [DISTWIDTH] IN BLOOD BY AUTOMATED COUNT: 11.3 % (ref 12.3–15.4)
HCT VFR BLD AUTO: 33.4 % (ref 34–46.6)
HGB BLD-MCNC: 11.4 G/DL (ref 12–15.9)
IMM GRANULOCYTES # BLD AUTO: 0.02 10*3/MM3 (ref 0–0.05)
IMM GRANULOCYTES NFR BLD AUTO: 0.3 % (ref 0–0.5)
LYMPHOCYTES # BLD AUTO: 2.68 10*3/MM3 (ref 0.7–3.1)
LYMPHOCYTES NFR BLD AUTO: 34.2 % (ref 19.6–45.3)
MCH RBC QN AUTO: 32.8 PG (ref 26.6–33)
MCHC RBC AUTO-ENTMCNC: 34.1 G/DL (ref 31.5–35.7)
MCV RBC AUTO: 96 FL (ref 79–97)
MONOCYTES # BLD AUTO: 0.56 10*3/MM3 (ref 0.1–0.9)
MONOCYTES NFR BLD AUTO: 7.1 % (ref 5–12)
NEUTROPHILS # BLD AUTO: 4.35 10*3/MM3 (ref 1.7–7)
NEUTROPHILS NFR BLD AUTO: 55.4 % (ref 42.7–76)
NRBC BLD AUTO-RTO: 0 /100 WBC (ref 0–0.2)
PLATELET # BLD AUTO: 269 10*3/MM3 (ref 140–450)
RBC # BLD AUTO: 3.48 10*6/MM3 (ref 3.77–5.28)
WBC # BLD AUTO: 7.84 10*3/MM3 (ref 3.4–10.8)

## 2024-11-11 LAB
DX ICD CODE: NORMAL
PATH REPORT.FINAL DX SPEC: NORMAL
PATH REPORT.GROSS SPEC: NORMAL
PATH REPORT.SITE OF ORIGIN SPEC: NORMAL
PATHOLOGIST NAME: NORMAL
PAYMENT PROCEDURE: NORMAL

## 2024-11-15 ENCOUNTER — OFFICE VISIT (OUTPATIENT)
Dept: OBSTETRICS AND GYNECOLOGY | Age: 44
End: 2024-11-15
Payer: COMMERCIAL

## 2024-11-15 VITALS
SYSTOLIC BLOOD PRESSURE: 120 MMHG | DIASTOLIC BLOOD PRESSURE: 74 MMHG | BODY MASS INDEX: 24.32 KG/M2 | WEIGHT: 146 LBS | HEIGHT: 65 IN

## 2024-11-15 DIAGNOSIS — N93.9 ABNORMAL UTERINE BLEEDING (AUB): Primary | ICD-10-CM

## 2024-11-15 PROCEDURE — 99213 OFFICE O/P EST LOW 20 MIN: CPT | Performed by: OBSTETRICS & GYNECOLOGY

## 2024-11-15 NOTE — PROGRESS NOTES
"  Chief complaint-abnormal uterine bleeding.    History of present illness- Patient is a 44 y.o.  who is here today with her mom to discuss some options.  She saw the nurse practitioner.  She has very abnormal uterine bleeding bleeding every 2 weeks.  She reports clots the size of her hand and wearing adult diapers due to the heavy bleeding.  Her hemoglobin is slightly low at 11.  Ultrasound showed a 9 cm uterus with a 1 cm fibroid.  No cysts were seen.  She does have a long history of pelvic pain.  She notes it is more on the right.  No ovarian cyst was seen.  Patient's endometrial biopsy was benign.        /74   Ht 165.1 cm (65\")   Wt 66.2 kg (146 lb)   BMI 24.30 kg/m²   Physical Exam  Constitutional:       General: She is not in acute distress.     Appearance: Normal appearance. She is not ill-appearing.   Neurological:      Mental Status: She is alert.   Psychiatric:         Mood and Affect: Mood normal.         Thought Content: Thought content normal.         Judgment: Judgment normal.         Pelvic ultrasound shows anteverted uterus that measures 9 cm in length.  There is noted to be 1 cm fibroid.    Diagnoses and all orders for this visit:    1. Abnormal uterine bleeding (AUB) (Primary)    We had a long discussion about treatment for abnormal uterine bleeding.  We discussed most definitive management is hysterectomy.  We also discussed endometrial ablation, IUD and oral contraceptive pills.  We discussed everything in detail with diagrams and written materials were given.  We discussed risk and benefits of all possibilities.  Patient is going to consider this and come back and see me in about a week.  "

## 2024-11-21 ENCOUNTER — OFFICE VISIT (OUTPATIENT)
Dept: OBSTETRICS AND GYNECOLOGY | Age: 44
End: 2024-11-21
Payer: COMMERCIAL

## 2024-11-21 VITALS
HEIGHT: 65 IN | WEIGHT: 146 LBS | BODY MASS INDEX: 24.32 KG/M2 | DIASTOLIC BLOOD PRESSURE: 74 MMHG | SYSTOLIC BLOOD PRESSURE: 122 MMHG

## 2024-11-21 DIAGNOSIS — R10.2 PELVIC PAIN: ICD-10-CM

## 2024-11-21 DIAGNOSIS — N93.9 ABNORMAL UTERINE BLEEDING (AUB): Primary | ICD-10-CM

## 2024-11-21 PROCEDURE — 99212 OFFICE O/P EST SF 10 MIN: CPT | Performed by: OBSTETRICS & GYNECOLOGY

## 2024-11-21 NOTE — PROGRESS NOTES
"  Chief [id number] of abnormal uterine bleeding.    History of present illness- Patient is a 44 y.o.  who complains of irregular cycles.  Patient does not have contraception.  She we discussed multiple options for treatments.  Patient desires the Mirena IUD.  She read through the materials.  She was worried that it may increase her acne.        /74   Ht 165.1 cm (65\")   Wt 66.2 kg (146 lb)   BMI 24.30 kg/m²   Physical Exam  Constitutional:       General: She is not in acute distress.     Appearance: Normal appearance. She is not ill-appearing.   Neurological:      Mental Status: She is alert.   Psychiatric:         Mood and Affect: Mood normal.         Thought Content: Thought content normal.         Judgment: Judgment normal.     Pelvic ultrasound done previously send showed an anteverted uterus measuring 9 cm in length with a 1 cm fibroid.    Diagnoses and all orders for this visit:    1. Abnormal uterine bleeding (AUB) (Primary)    2. Pelvic pain    We discussed Mirena IUD in detail.  We discussed main side effect is irregular bleeding especially at the start of the IUD.  Some patients have seen increase in acne.  Patient would like to proceed.  We will order the IUD and she will return in 2 weeks for placement.  "

## 2024-12-13 ENCOUNTER — OFFICE VISIT (OUTPATIENT)
Dept: OBSTETRICS AND GYNECOLOGY | Age: 44
End: 2024-12-13
Payer: COMMERCIAL

## 2024-12-13 VITALS
WEIGHT: 145 LBS | SYSTOLIC BLOOD PRESSURE: 124 MMHG | BODY MASS INDEX: 24.16 KG/M2 | HEIGHT: 65 IN | DIASTOLIC BLOOD PRESSURE: 82 MMHG

## 2024-12-13 DIAGNOSIS — Z30.430 ENCOUNTER FOR IUD INSERTION: ICD-10-CM

## 2024-12-13 DIAGNOSIS — Z13.9 SPECIAL SCREENING: Primary | ICD-10-CM

## 2024-12-13 PROBLEM — Z97.5 IUD (INTRAUTERINE DEVICE) IN PLACE: Status: ACTIVE | Noted: 2024-12-13

## 2024-12-13 RX ORDER — IBUPROFEN 400 MG/1
400 TABLET, FILM COATED ORAL EVERY 6 HOURS PRN
COMMUNITY

## 2024-12-13 NOTE — PROGRESS NOTES
IUD Insertion    Patient's last menstrual period was 11/30/2024 (approximate).    Date of procedure:  12/13/2024    Risks and benefits discussed? yes  All questions answered? yes  Consents given by The patient  Written consent obtained? yes    Local anesthesia used:  no    Procedure documentation:    After verifying the patient had a low probability of being pregnant and met the criteria for insertion, a sterile speculum has placed and the cervix was cleansed with an antiseptic solution.  Vaginal discharge was scant.  The anterior lip of the cervix was grasped with a tenaculum and the uterine cavity was gently sounded. There was no difficulty passing the sound through the cervix.  Cervical dilation did not need to be performed prior to placing the IUD.  The uterus was anteverted and sounded to 10 cms.  The Mirena was then prepared per the manufacturers instructions.    The Mirena was advanced to a point 2 cms from the fundus and then the arms were released from the sheath.  The device was advanced to the fundus and the device was released fully from the sheath. The string was cut 3 cms in length.  Bleeding from the cervix was scant.    She tolerated the procedure without any difficulty.     Post procedure instructions: We reviewed bleeding patterns that are common with the Mirena IUD.  Recommend NSAIDs and heat for discomfort.  Patient will call if she has fever or pain not controlled with NSAIDs.  Follow-up for IUD check in 6 weeks.    December 13, 2024

## 2025-01-13 ENCOUNTER — TELEPHONE (OUTPATIENT)
Dept: OBSTETRICS AND GYNECOLOGY | Age: 45
End: 2025-01-13

## 2025-01-13 NOTE — TELEPHONE ENCOUNTER
Caller: Jimena Ferguson    Relationship to patient: Self    Best call back number: [phone] (home)       Patient is needing: PT WAS SCHEDULED FOR AN IUD CHECK ON 01/23 BUT NEEDED TO RESCHEDULE. THE NEXT AVAILABLE THAT WOULD WORK WITH HER SCHEDULED WE 04/03. RESCHEDULED TO THAT DAY. ADVISED SHE WOULD BE CONTACTED IF SHE NEEDED TO GET IN SOONER

## 2025-01-13 NOTE — TELEPHONE ENCOUNTER
PT wants to confirm that her IUD chk was pushed out further and wants to confirm if the 4/3 was okay to leave due to conflicts with her schedule.

## 2025-05-05 ENCOUNTER — OFFICE VISIT (OUTPATIENT)
Dept: OBSTETRICS AND GYNECOLOGY | Age: 45
End: 2025-05-05
Payer: COMMERCIAL

## 2025-05-05 VITALS
BODY MASS INDEX: 25.19 KG/M2 | WEIGHT: 151.2 LBS | DIASTOLIC BLOOD PRESSURE: 70 MMHG | SYSTOLIC BLOOD PRESSURE: 120 MMHG | HEIGHT: 65 IN

## 2025-05-05 DIAGNOSIS — Z30.431 IUD CHECK UP: Primary | ICD-10-CM

## 2025-05-05 NOTE — PROGRESS NOTES
"Subjective   Jimena Ferguson is a 45 y.o. female is being seen today for   Chief Complaint   Patient presents with    Follow-up     Gyn F/u cc: pt here for 6wks iud ck    .    History of Present Illness    Patient of Dr Ramon  Here for IUD check  Had mirena placed in December  Had bleeding for 6-8 weeks following placement but no bleeding since  Occasionally can feel some cyclical symptoms   She is very happy with the IUD  No problems or concerns    The following portions of the patient's history were reviewed and updated as appropriate: allergies, current medications, past family history, past medical history, past social history, past surgical history and problem list.    /70   Ht 165.1 cm (65\")   Wt 68.6 kg (151 lb 3.2 oz)   BMI 25.16 kg/m²         Review of Systems   Constitutional: Negative.    HENT: Negative.     Eyes: Negative.    Respiratory: Negative.     Cardiovascular: Negative.    Gastrointestinal: Negative.    Endocrine: Negative.    Genitourinary: Negative.    Musculoskeletal: Negative.    Skin: Negative.    Allergic/Immunologic: Negative.    Neurological: Negative.    Hematological: Negative.    Psychiatric/Behavioral: Negative.         Objective   Physical Exam  Constitutional:       Appearance: She is well-developed.   Abdominal:      Palpations: Abdomen is soft.      Tenderness: There is no abdominal tenderness.   Genitourinary:     Vagina: Normal.      Comments: IUD string present  Psychiatric:         Behavior: Behavior normal.         Assessment & Plan   Diagnoses and all orders for this visit:    1. IUD check up (Primary)      Normal IUD check  Follow up AE and PRN           Total time spent today with Jimena  was 20 minutes (level 3).  Greater than 50% of the time was spent coordinating care, answering her questions and counseling regarding pathophysiology of her presenting problem along with plans for any diagnositc work-up and treatment.  "